# Patient Record
Sex: FEMALE | Race: WHITE | NOT HISPANIC OR LATINO | URBAN - METROPOLITAN AREA
[De-identification: names, ages, dates, MRNs, and addresses within clinical notes are randomized per-mention and may not be internally consistent; named-entity substitution may affect disease eponyms.]

---

## 2020-05-14 ENCOUNTER — TELEPHONE (OUTPATIENT)
Dept: FAMILY MEDICINE CLINIC | Facility: CLINIC | Age: 50
End: 2020-05-14

## 2020-05-14 DIAGNOSIS — R51.9 NONINTRACTABLE HEADACHE, UNSPECIFIED CHRONICITY PATTERN, UNSPECIFIED HEADACHE TYPE: Primary | ICD-10-CM

## 2020-05-14 RX ORDER — SUMATRIPTAN 100 MG/1
100 TABLET, FILM COATED ORAL ONCE AS NEEDED
Qty: 9 TABLET | Refills: 3 | Status: SHIPPED | OUTPATIENT
Start: 2020-05-14 | End: 2020-07-29

## 2020-07-29 DIAGNOSIS — R51.9 NONINTRACTABLE HEADACHE, UNSPECIFIED CHRONICITY PATTERN, UNSPECIFIED HEADACHE TYPE: ICD-10-CM

## 2020-07-29 RX ORDER — SUMATRIPTAN 100 MG/1
TABLET, FILM COATED ORAL
Qty: 9 TABLET | Refills: 2 | Status: SHIPPED | OUTPATIENT
Start: 2020-07-29 | End: 2020-08-20

## 2020-08-20 DIAGNOSIS — R51.9 NONINTRACTABLE HEADACHE, UNSPECIFIED CHRONICITY PATTERN, UNSPECIFIED HEADACHE TYPE: ICD-10-CM

## 2020-08-20 RX ORDER — SUMATRIPTAN 100 MG/1
TABLET, FILM COATED ORAL
Qty: 9 TABLET | Refills: 2 | Status: SHIPPED | OUTPATIENT
Start: 2020-08-20 | End: 2020-08-24

## 2020-08-24 RX ORDER — SUMATRIPTAN 100 MG/1
TABLET, FILM COATED ORAL
Qty: 9 TABLET | Refills: 2 | Status: SHIPPED | OUTPATIENT
Start: 2020-08-24 | End: 2021-01-19 | Stop reason: SDUPTHER

## 2020-08-24 NOTE — TELEPHONE ENCOUNTER
I tried prior Kamlesh Gan and it said that it is covered under her insurance it's just that the refill is too soon

## 2020-09-09 ENCOUNTER — TELEPHONE (OUTPATIENT)
Dept: FAMILY MEDICINE CLINIC | Facility: CLINIC | Age: 50
End: 2020-09-09

## 2020-09-09 NOTE — TELEPHONE ENCOUNTER
Patient continues with headache prefers to be seen by a neurologist is requesting a Dr-Dr referral once completed please call patient back, thanks   ND

## 2020-09-10 DIAGNOSIS — R51.9 HEADACHE DISORDER: Primary | ICD-10-CM

## 2021-01-08 ENCOUNTER — TELEPHONE (OUTPATIENT)
Dept: NEUROLOGY | Facility: CLINIC | Age: 51
End: 2021-01-08

## 2021-01-19 ENCOUNTER — CONSULT (OUTPATIENT)
Dept: NEUROLOGY | Facility: CLINIC | Age: 51
End: 2021-01-19
Payer: COMMERCIAL

## 2021-01-19 VITALS — DIASTOLIC BLOOD PRESSURE: 68 MMHG | SYSTOLIC BLOOD PRESSURE: 124 MMHG | HEART RATE: 82 BPM | WEIGHT: 154.6 LBS

## 2021-01-19 DIAGNOSIS — R51.9 NONINTRACTABLE HEADACHE, UNSPECIFIED CHRONICITY PATTERN, UNSPECIFIED HEADACHE TYPE: ICD-10-CM

## 2021-01-19 DIAGNOSIS — R51.9 HEADACHE DISORDER: ICD-10-CM

## 2021-01-19 PROCEDURE — 99244 OFF/OP CNSLTJ NEW/EST MOD 40: CPT | Performed by: PSYCHIATRY & NEUROLOGY

## 2021-01-19 RX ORDER — IBUPROFEN 200 MG
200 TABLET ORAL AS NEEDED
COMMUNITY
End: 2022-01-13 | Stop reason: ALTCHOICE

## 2021-01-19 RX ORDER — SUMATRIPTAN 100 MG/1
100 TABLET, FILM COATED ORAL ONCE AS NEEDED
Qty: 9 TABLET | Refills: 3 | Status: SHIPPED | OUTPATIENT
Start: 2021-01-19 | End: 2021-06-09

## 2021-01-19 NOTE — PATIENT INSTRUCTIONS
Additionally on headache diary - stress level, what you ate, weather    Try OTC medications: riboflavin, magnesium, CoQ10

## 2021-01-19 NOTE — ASSESSMENT & PLAN NOTE
Zev Perla is presenting to clinic 01/19/21 for management of headache  Her headaches have evolved from behind the eyes to the back of the neck over the past few years  No aura  Family history positive in mother, sister, and grandmother     Pt denies red flag symptoms such as sudden onset headache, focal exam findings, stiff neck, temperature, altered mental status    Medications:   Previous trials: magnesium/multiivitamin dulled headaches to a 2/10   Current medications (abortive and preventative): Imitrex, motrin    Workup:   No previous imaging - imaging deferred at this time    Plan:   Pt counseled to increase intake of fluids to  oz daily   Pt counseled on benefits of OTC magnesium and riboflavin, coq10   Medication plan: continue sumatriptan and motrin   Imaging recommendations: none at this time   Follow up in 4 months   Add weather, diet, stress to headache diary   Remove triggering foods from diet again   Increase exercise   Participate in stress reducing activities as discussed during visit   If no improvement in headache frequency at next visit will consider possible imaging or preventative medication at that time

## 2021-01-19 NOTE — PROGRESS NOTES
NEUROLOGY RESIDENCY OUTPATIENT CONSULT NOTE       Name:  Elvia Cabezas  MRN: [id number]  : 1970  Date: 21    ASSESSMENT & PLAN     Headache disorder  Elvia Cabezas is presenting to clinic 21 for management of headache  Her headaches have evolved from behind the eyes to the back of the neck over the past few years  No aura  Family history positive in mother, sister, and grandmother   Pt denies red flag symptoms such as sudden onset headache, focal exam findings, stiff neck, temperature, altered mental status    Medications:   Previous trials: magnesium/multiivitamin dulled headaches to a 2/10   Current medications (abortive and preventative): Imitrex, motrin    Workup:   No previous imaging - imaging deferred at this time    Plan:   Pt counseled to increase intake of fluids to  oz daily   Pt counseled on benefits of OTC magnesium and riboflavin, coq10   Medication plan: continue sumatriptan and motrin   Imaging recommendations: none at this time   Follow up in 4 months   Add weather, diet, stress to headache diary   Remove triggering foods from diet again   Increase exercise   Participate in stress reducing activities as discussed during visit   If no improvement in headache frequency at next visit will consider possible imaging or preventative medication at that time       Diagnoses and all orders for this visit:    Headache disorder  -     Ambulatory referral to Neurology    Nonintractable headache, unspecified chronicity pattern, unspecified headache type  -     SUMAtriptan (IMITREX) 100 mg tablet; Take 1 tablet (100 mg total) by mouth once as needed for migraine for up to 36 doses    Other orders  -     ibuprofen (MOTRIN) 200 mg tablet;  Take 200 mg by mouth as needed for headaches         SUBJECTIVE     HPI (taken on 21):    Elvia Cabezas is a 46 y o  female who presents to neuro clinic for assessment of chronic headahce occurring approx 4 days a month for 2-24 hours at a time  The patient states these headaches initially started as a teenager and symptoms appear to be aggravated or provoked by foods, stress, weather and are alleviated by motrin and sumatriptan  She describes the quality and character of the headaches as throbbing pain radiating from the neck up and over the head  On a scale of 1-10, she describes the average headache as a 5-8/10  Since the original onset of these headaches, she states they have stayed the same in character but have increased in frequency this year  She endorses associated symptoms of photophobia and nausea, and denies, numbness/tingling, visual disturbances, auras, or other symptoms  Previous workup for these headaches has included nothing  Previous medications tried for headaches include: motrin, sumatriptan, Excedrin migraine  she currently takes sumatriptan and motrin which improve the symptoms  The patient has a medical history pertinent for anxiety  Family history is significant for migraine in her mother and grandmother and migraine with aura in her sister  In terms of social history, she does not use tobacco, does not use etoh, and does not use illicit substances  She is a registered dietician  On presentation to the clinic today, the patient does not presently have a headache  Prior medications tried:  - Preventative: none   - Abortive: sumatriptan, Excedrin migraine, motrin, tylenol    Headaches:  Headaches started at what age - teenager  Accident or injury prior to onset of headaches - no  How often do the headaches occur - 1-2 times per month but come and go for several days   What time of the day do the headaches start - anytime     How long do the headaches last - all day  Are you ever headache free - yes  Where are they located - radiate from the neck over the head  What is the intensity of pain -5-8/10  Describe your usual headache - Throbbing, Pounding, Pressure  Associated symptoms: photophobia, nausea, problem with concentration, stiff or sore neck, prefer to be alone and in a dark room, unable to work  Headaches are worse if the patient: has stress or eats poorly  Headache trigger: Fatigue, Stress/Tension, Weather change, bright lights, sunlight, Certain Foods (MSG), Menstruation, Caffeine, Lack of sleep, Oversleeping,   Are you current pregnant or planning on getting pregnant? no  Have you had trigger point injection performed and how often -no  Have you had Botox injection performed and how often -no  Have you had epidural injections or transforaminal injections performed -no  What medications do you take or have you taken for your headaches? PREVENTIVE: none  ABORTIVE: sumatriptan, motrin, excedrin migraine, tylenol      Past Medical History:  History reviewed  No pertinent past medical history  Allergies:  No Known Allergies    Family history:  Family History   Problem Relation Age of Onset    Skin cancer Mother     Prostate cancer Father     Hypertension Father     Hyperlipidemia Father     Skin cancer Father     Stroke Maternal Grandmother     Diabetes Paternal Grandfather     Bipolar disorder Maternal Uncle     Colon cancer Paternal Aunt 72       Social History  Living situation:  At home with  and son  Tobacco:  No tobacco use   Alcohol:  No alcohol use  Drugs:  No illegal drug use  Work:  dietician  Driving:  yes    OBJECTIVE     Patient ID: Nereyda Pierre is a 46 y o  female    Blood pressure 124/68, pulse 82, weight 70 1 kg (154 lb 9 6 oz)  General exam   Appearance: normally developed, appears well  Cardiovascular: regular rate and rhythm  Pulmonary: clear to auscultation  Extremities: normal color, temperature, peripheral pulses intact  HEENT: anicteric     Neurologic Exam     Mental Status   Oriented to person, place, and time  Attention: normal  Concentration: normal    Speech: speech is normal   Level of consciousness: alert  Knowledge: good  Normal comprehension       Cranial Nerves     CN II   Visual fields full to confrontation  CN III, IV, VI   Pupils are equal, round, and reactive to light  Extraocular motions are normal    Nystagmus: none     CN V   Facial sensation intact  CN VII   Facial expression full, symmetric  CN VIII   CN VIII normal      CN IX, X   CN IX normal    CN X normal      CN XI   CN XI normal      CN XII   CN XII normal      Motor Exam   Muscle bulk: normal  Overall muscle tone: normal  Right arm pronator drift: absent  Left arm pronator drift: absent    Strength   Strength 5/5 throughout  Sensory Exam   Light touch normal      Gait, Coordination, and Reflexes     Gait  Gait: normal    Coordination   Romberg: negative  Finger to nose coordination: normal    Tremor   Resting tremor: absent  Intention tremor: absent  Action tremor: absent    Reflexes   Right brachioradialis: 2+  Left brachioradialis: 2+  Right biceps: 2+  Left biceps: 2+  Right triceps: 2+  Left triceps: 2+  Right patellar: 2+  Left patellar: 2+  Right achilles: 2+  Left achilles: 2+       IMAGING & DIAGNOSTIC TESTING     Radiology Results: I have personally reviewed pertinent reports  Other Diagnostic Testing: I have personally reviewed pertinent reports  ACTIVE MEDICATIONS     Prior to Admission medications    Medication Sig Start Date End Date Taking? Authorizing Provider   ibuprofen (MOTRIN) 200 mg tablet Take 200 mg by mouth as needed for headaches   Yes Historical Provider, MD   SUMAtriptan (IMITREX) 100 mg tablet TAKE 1 TABLET BY MOUTH AT ONSET OF MIGRAINE MAY REPEAT AGAIN IN 2 HRS 8/24/20  Yes MD AGUSTIN Sen     Review of systems as documented by the MA was reviewed in full by myself, Saundra Beck MD    ROS:    Review of Systems   Constitutional: Negative  Negative for appetite change and fever  HENT: Negative  Negative for hearing loss, tinnitus, trouble swallowing and voice change  Eyes: Negative  Negative for photophobia and pain  Respiratory: Negative  Negative for shortness of breath  Cardiovascular: Negative  Negative for palpitations  Gastrointestinal: Negative  Negative for nausea and vomiting  Endocrine: Negative  Negative for cold intolerance  Genitourinary: Negative  Negative for dysuria, frequency and urgency  Musculoskeletal: Negative  Negative for myalgias and neck pain  Skin: Negative  Negative for rash  Neurological: Positive for headaches  Negative for dizziness, tremors, seizures, syncope, facial asymmetry, speech difficulty, weakness, light-headedness and numbness  Hematological: Negative  Does not bruise/bleed easily  Psychiatric/Behavioral: Negative  Negative for confusion, hallucinations and sleep disturbance

## 2021-04-27 ENCOUNTER — OFFICE VISIT (OUTPATIENT)
Dept: NEUROLOGY | Facility: CLINIC | Age: 51
End: 2021-04-27
Payer: COMMERCIAL

## 2021-04-27 VITALS — HEART RATE: 76 BPM | SYSTOLIC BLOOD PRESSURE: 130 MMHG | DIASTOLIC BLOOD PRESSURE: 78 MMHG

## 2021-04-27 DIAGNOSIS — R51.9 HEADACHE DISORDER: Primary | ICD-10-CM

## 2021-04-27 PROCEDURE — 99213 OFFICE O/P EST LOW 20 MIN: CPT | Performed by: PSYCHIATRY & NEUROLOGY

## 2021-04-27 NOTE — PROGRESS NOTES
Patient ID: Chuckie Barrios is a 46 y o  female  Assessment/Plan:    No problem-specific Assessment & Plan notes found for this encounter  {Assess/PlanSmartLinks:40864}       Subjective:    HPI    {[facility] HPI texts:04776}    {Common ambulatory SmartLinks:08364}         Objective:    Blood pressure 130/78, pulse 76  Physical Exam    Neurological Exam      ROS:    Review of Systems   Constitutional: Negative  Negative for appetite change and fever  HENT: Negative  Negative for hearing loss, tinnitus, trouble swallowing and voice change  Eyes: Negative  Negative for photophobia and pain  Respiratory: Negative  Negative for shortness of breath  Cardiovascular: Negative  Negative for palpitations  Gastrointestinal: Negative  Negative for nausea and vomiting  Endocrine: Negative  Negative for cold intolerance  Genitourinary: Negative  Negative for dysuria, frequency and urgency  Musculoskeletal: Negative  Negative for myalgias and neck pain  Skin: Negative  Negative for rash  Neurological: Positive for headaches  Negative for dizziness, tremors, seizures, syncope, facial asymmetry, speech difficulty, weakness, light-headedness and numbness  Hematological: Negative  Does not bruise/bleed easily  Psychiatric/Behavioral: Negative  Negative for confusion, hallucinations and sleep disturbance

## 2021-04-27 NOTE — ASSESSMENT & PLAN NOTE
Percy Rangel is presenting to clinic 04/27/21 for management of headache  Her headaches have evolved from behind the eyes to the back of the neck over the past few years  No aura  Family history positive in mother, sister, and grandmother     Pt denies red flag symptoms such as sudden onset headache, focal exam findings, stiff neck, temperature, altered mental status    Medications:   Previous trials: magnesium/multiivitamin dulled headaches to a 2/10   Current medications (abortive and preventative): Imitrex, motrin    Workup:   No previous imaging - imaging deferred at this time    Plan:   Pt counseled to increase intake of fluids to  oz daily   Pt counseled on benefits of OTC magnesium and riboflavin, coq10   Medication plan: continue sumatriptan and motrin   Imaging recommendations: none at this time   Follow up in 4 months   Add weather, diet, stress to headache diary   Remove triggering foods from diet again   Increase exercise   Participate in stress reducing activities as discussed during visit

## 2021-04-27 NOTE — PATIENT INSTRUCTIONS
Take magnesium and B2 consistently  Keep stress down  Keep working on diet and your diet triggers  Goal is to get down to 1 dose of sumatriptan per month! Remember to monitor your posture and don't over do it! Keep positive!

## 2021-04-27 NOTE — PROGRESS NOTES
NEUROLOGY RESIDENCY OUTPATIENT FOLLOW UP NOTE       Name:  Abilio Galo  MRN: [id number]  : 1970  Date: 21    ASSESSMENT & PLAN     Headache disorder  Abilio Galo is presenting to clinic 21 for management of headache  Her headaches have evolved from behind the eyes to the back of the neck over the past few years  No aura  Family history positive in mother, sister, and grandmother   Pt denies red flag symptoms such as sudden onset headache, focal exam findings, stiff neck, temperature, altered mental status    Medications:   Previous trials: magnesium/multiivitamin dulled headaches to a 2/10   Current medications (abortive and preventative): Imitrex, motrin    Workup:   No previous imaging - imaging deferred at this time    Plan:   Pt counseled to increase intake of fluids to  oz daily   Pt counseled on benefits of OTC magnesium and riboflavin, coq10   Medication plan: continue sumatriptan and motrin   Imaging recommendations: none at this time   Follow up in 4 months   Add weather, diet, stress to headache diary   Remove triggering foods from diet again   Increase exercise   Participate in stress reducing activities as discussed during visit       Diagnoses and all orders for this visit:    Headache disorder         SUBJECTIVE     Abilio Galo is presenting for follow up on chronic migraine since she was a teenager  Relevant medical history includes: anxiety, FH significant for migraine in her mother, grandmother and migraine with aura in her sister  First evaluation was 21  LOV same as above    To review, Abilio Galo presented for evaluation of headache  She had migraines since she was a teenager and was previously on several abortive meds such as imitrex, and other OTC options  She reported a gradual increase in frequency over the last year and felt they were exacerbated by stress, certain foods, weather but denied any auras   At her last visit, she wanted to continue on imitrex and ibuprofen and deferred imaging unless there was no improvement  Previous medications:  - tylenol, ibuprofen    Current medications:  - imitrex 100mg  - motrin 200mg    Interval history as of 04/27/21, improvement since last visit  Not consistent with mag or B2 but would like to take more often and thinks it will help  Has only been having about 2 headaches requiring imitrex a month  Has been keeping journal and has been good at not skipping meals  Recognizes that she could eat better and does have less stress now  OBJECTIVE     Patient ID: Lauren Worthy is a 46 y o  female    Blood pressure 130/78, pulse 76  GENERAL EXAM:    CONSTITUTIONAL: Well developed, well nourished, well groomed  No dysmorphic features  Eyes:  PERRLA, EOM normal      Neck:  Normal ROM, neck supple  HEENT:  Normocephalic atraumatic  No meningismus  Oropharynx is clear and moist  No oral mucosal lesions  Chest:  Respirations regular and unlabored  Cardiovascular:  Distal extremities warm without palpable edema or tenderness, no observed significant swelling  Musculoskeletal:  Full range of motion  Skin:  warm and dry   Psychiatric:  Normal behavior and appropriate affect        Neurological Exam  Mental Status  Awake, alert and oriented to person, place and time  Recent and remote memory are intact  Speech is normal  Language is fluent with no aphasia  Attention and concentration are normal  Fund of knowledge is appropriate for level of education  Cranial Nerves  CN II: Visual acuity is normal  Visual fields full to confrontation  CN III, IV, VI: Extraocular movements intact bilaterally  Normal lids and orbits bilaterally  Pupils equal round and reactive to light bilaterally  CN V: Facial sensation is normal   CN VII: Full and symmetric facial movement  CN VIII: Hearing is normal   CN IX, X: Palate elevates symmetrically  Normal gag reflex    CN XI: Shoulder shrug strength is normal   CN XII: Tongue midline without atrophy or fasciculations  Motor  Normal muscle bulk throughout  No fasciculations present  Normal muscle tone  No abnormal involuntary movements  Strength is 5/5 throughout all four extremities  Sensory  Sensation is intact to light touch, pinprick, vibration and proprioception in all four extremities  Reflexes  Deep tendon reflexes are 2+ and symmetric in all four extremities with downgoing toes bilaterally  Coordination  Finger-to-nose, rapid alternating movements and heel-to-shin normal bilaterally without dysmetria  Gait  Normal casual, toe, heel and tandem gait  IMAGING & DIAGNOSTIC TESTING     Radiology Results: I have personally reviewed pertinent reports and I have personally reviewed pertinent films in PACS    Other Diagnostic Testing: I have personally reviewed pertinent reports  STUDIES REVIEWED:    Imaging:  No imaging at this time    EEGs/EMGs:  none    Labs:  None pertinent    ACTIVE MEDICATIONS     Prior to Admission medications    Medication Sig Start Date End Date Taking? Authorizing Provider   ibuprofen (MOTRIN) 200 mg tablet Take 200 mg by mouth as needed for headaches    Historical Provider, MD   SUMAtriptan (IMITREX) 100 mg tablet Take 1 tablet (100 mg total) by mouth once as needed for migraine for up to 36 doses 1/19/21   Zacarias Russell MD       ROS     Review of systems as documented by the MA was reviewed in full by myself, Zacarias Russell MD    Review of Systems   Constitutional: Negative  Negative for appetite change and fever  HENT: Negative  Negative for hearing loss, tinnitus, trouble swallowing and voice change  Eyes: Negative  Negative for photophobia and pain  Respiratory: Negative  Negative for shortness of breath  Cardiovascular: Negative  Negative for palpitations  Gastrointestinal: Negative  Negative for nausea and vomiting  Endocrine: Negative  Negative for cold intolerance     Genitourinary: Negative  Negative for dysuria, frequency and urgency  Musculoskeletal: Negative  Negative for myalgias and neck pain  Skin: Negative  Negative for rash  Neurological: Positive for headaches  Negative for dizziness, tremors, seizures, syncope, facial asymmetry, speech difficulty, weakness, light-headedness and numbness  Hematological: Negative  Does not bruise/bleed easily  Psychiatric/Behavioral: Negative  Negative for confusion, hallucinations and sleep disturbance

## 2021-06-09 DIAGNOSIS — R51.9 NONINTRACTABLE HEADACHE, UNSPECIFIED CHRONICITY PATTERN, UNSPECIFIED HEADACHE TYPE: ICD-10-CM

## 2021-06-09 RX ORDER — SUMATRIPTAN 100 MG/1
100 TABLET, FILM COATED ORAL ONCE AS NEEDED
Qty: 9 TABLET | Refills: 3 | Status: SHIPPED | OUTPATIENT
Start: 2021-06-09 | End: 2021-10-14

## 2021-09-27 ENCOUNTER — OFFICE VISIT (OUTPATIENT)
Dept: DERMATOLOGY | Facility: CLINIC | Age: 51
End: 2021-09-27
Payer: COMMERCIAL

## 2021-09-27 VITALS — BODY MASS INDEX: 25.15 KG/M2 | WEIGHT: 156.5 LBS | HEIGHT: 66 IN | TEMPERATURE: 98 F

## 2021-09-27 DIAGNOSIS — D22.5 MULTIPLE BENIGN MELANOCYTIC NEVI OF UPPER EXTREMITY, LOWER EXTREMITY, AND TRUNK: ICD-10-CM

## 2021-09-27 DIAGNOSIS — L81.4 LENTIGO: ICD-10-CM

## 2021-09-27 DIAGNOSIS — I83.93 VARICOSE VEINS OF BOTH LOWER EXTREMITIES, UNSPECIFIED WHETHER COMPLICATED: ICD-10-CM

## 2021-09-27 DIAGNOSIS — L57.0 ACTINIC KERATOSIS: ICD-10-CM

## 2021-09-27 DIAGNOSIS — D48.5 NEOPLASM OF UNCERTAIN BEHAVIOR OF SKIN: Primary | ICD-10-CM

## 2021-09-27 DIAGNOSIS — L81.2 EPHELIS: ICD-10-CM

## 2021-09-27 DIAGNOSIS — D22.70 MULTIPLE BENIGN MELANOCYTIC NEVI OF UPPER EXTREMITY, LOWER EXTREMITY, AND TRUNK: ICD-10-CM

## 2021-09-27 DIAGNOSIS — D22.60 MULTIPLE BENIGN MELANOCYTIC NEVI OF UPPER EXTREMITY, LOWER EXTREMITY, AND TRUNK: ICD-10-CM

## 2021-09-27 PROCEDURE — 11102 TANGNTL BX SKIN SINGLE LES: CPT | Performed by: DERMATOLOGY

## 2021-09-27 PROCEDURE — 88305 TISSUE EXAM BY PATHOLOGIST: CPT | Performed by: PATHOLOGY

## 2021-09-27 PROCEDURE — 99204 OFFICE O/P NEW MOD 45 MIN: CPT | Performed by: DERMATOLOGY

## 2021-09-27 PROCEDURE — 17000 DESTRUCT PREMALG LESION: CPT | Performed by: DERMATOLOGY

## 2021-09-27 NOTE — PROGRESS NOTES
[facility] Note     Patient Name: Reshma Wells  Encounter Date: 09/27/2021     Have you been cared for by a [facility] in the last 3 years and, if so, which one? No    · Have you traveled outside of the Health system in the past 3 months? No     May we call your Preferred Phone number to discuss your specific medical information? Yes     May we leave a detailed message that includes your specific medical information? Yes      Today's Chief Concerns:   Concern #1:  Full check    Concern #2:  Area of concern on right upper arm    Past Medical History:  Have you personally ever had or currently have any of the following? · Skin cancer (such as Melanoma, Basal Cell Carcinoma, Squamous Cell Carcinoma? (If Yes, please provide more detail)- No  · Eczema: No  · Psoriasis: No  · HIV/AIDS: No  · Hepatitis B or C: No  · Tuberculosis: No  · Systemic Immunosuppression such as Diabetes, Biologic or Immunotherapy, Chemotherapy, Organ Transplantation, Bone Marrow Transplantation (If YES, please provide more detail): No  · Radiation Treatment (If YES, please provide more detail): No  · Any other major medical conditions/concerns? 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 Gardening and reading     Family history:    Have any of your "first degree relatives" (parent, brother, sister, or child) had any of the following       · Skin cancer such as Melanoma or Merkel Cell Carcinoma or Pancreatic Cancer? YES, father - basal cell and squamous cell  Mother- basal cell   · Eczema, Asthma, Hay Fever or Seasonal Allergies: No  · Psoriasis or Psoriatic Arthritis: No  · Do any other medical conditions seem to run in your family? If Yes, what condition and which relatives?   No    Current Medications:    Current Outpatient Medications:     ibuprofen (MOTRIN) 200 mg tablet, Take 200 mg by mouth as needed for headaches, Disp: , Rfl:     SUMAtriptan (IMITREX) 100 mg tablet, TAKE 1 TABLET (100 MG TOTAL) BY MOUTH ONCE AS NEEDED FOR MIGRAINE FOR UP TO 36 DOSES, Disp: 9 tablet, Rfl: 3      Review of Systems/System Alerts:  Have you recently had or currently have any of the following? If YES, what are you doing for the problem? · Fever, chills or unintended weight loss: No  · Sudden loss or change in your vision: No  · Nausea, vomiting or blood in your stool: No  · Painful or swollen joints: No  · Wheezing or cough: No  · Changing mole or non-healing wound: No  · Nosebleeds: No  · Excessive sweating: No  · Easy or prolonged bleeding? No  · Over the last 2 weeks, how often have you been bothered by the following problems? · Taking little interest or pleasure in doing things: 1 - Not at All  · Feeling down, depressed, or hopeless: 1 - Not at All  · Rapid heartbeat with epinephrine:  No  · FEMALES ONLY:    · Are you pregnant or planning to become pregnant? No  · Are you currently or planning to be nursing or breast feeding? No  · Any known allergies? No  No Known Allergies      PHYSICAL EXAM:       Was a chaperone (Derm Clinical Assistant) present throughout the entire Physical Exam? Yes     Did the Dermatology Team specifically  the patient on the importance of a Full Skin Exam to be sure that nothing is missed clinically?  Yes}  o Did the patient request or accept a Full Skin Exam?  Yes  o Did the patient specifically refuse to have the areas "under-the-bra" examined by the Dermatologist? No  o Did the patient specifically refuse to have the areas "under-the-underwear" examined by the Dermatologist? No      CONSTITUTIONAL:   Vitals:    09/27/21 0949   Temp: 98 °F (36 7 °C)   TempSrc: Temporal   Weight: 71 kg (156 lb 8 oz)   Height: 5' 6 34" (1 685 m)         PSYCH: Normal mood and affect  EYES: Normal conjunctiva  ENT: Normal lips and oral mucosa  CARDIOVASCULAR: No edema  RESPIRATORY: Normal respirations  HEME/LYMPH/IMMUNO:  No regional lymphadenopathy except as noted below in ASSESSMENT AND PLAN BY DIAGNOSIS    SKIN:  FULL ORGAN SYSTEM EXAM  Hair, Scalp, Ears, Face Normal except as noted below in Assessment   Neck, Cervical Chain Nodes Normal except as noted below in Assessment   Right Arm/Hand/Fingers Normal except as noted below in Assessment   Left Arm/Hand/Fingers Normal except as noted below in Assessment   Chest/Breasts/Axillae Viewed areas Normal except as noted below in Assessment   Abdomen, Umbilicus Normal except as noted below in Assessment   Back/Spine Normal except as noted below in Assessment   Groin/Genitalia/Buttocks NOT EXAMINED   Right Leg, Foot, Toes Normal except as noted below in Assessment   Left Leg, Foot, Toes Normal except as noted below in Assessment        ASSESSMENT AND PLAN BY DIAGNOSIS:    History of Present Condition:     Duration:  How long has this been an issue for you?    o  last year    Location Affected:  Where on the body is this affecting you?    o  right upper arm    Quality:  Is there any bleeding, pain, itch, burning/irritation, or redness associated with the skin lesion? o  slight bleeding    Severity:  Describe any bleeding, pain, itch, burning/irritation, or redness on a scale of 1 to 10 (with 10 being the worst)  o  n/a   Timing:  Does this condition seem to be there pretty constantly or do you notice it more at specific times throughout the day?     o  constant    Context:  Have you ever noticed that this condition seems to be associated with specific activities you do?    o  denies    Modifying Factors:    o Anything that seems to make the condition worse?    -  rubbing   o What have you tried to do to make the condition better?    -  lotion    Associated Signs and Symptoms:  Does this skin lesion seem to be associated with any of the following:  o  SL AMB DERM SIGNS AND SYMPTOMS: Redness       LENTIGO    Physical Exam:   Anatomic Location Affected:  Upper extremities    Morphological Description:  light brown macules    Pertinent Positives:   Pertinent Negatives: Additional History of Present Condition:  Present on exam     Assessment and Plan:  Based on a thorough discussion of this condition and the management approach to it (including a comprehensive discussion of the known risks, side effects and potential benefits of treatment), the patient (family) agrees to implement the following specific plan:   The nature of sun-induced photo-aging and skin cancers is discussed  Sun avoidance, protective clothing, and the use of 30-SPF sunscreens is advised  Observe closely for skin damage/changes, and call if such occurs   Reassured benign     What is a lentigo? A lentigo is a pigmented flat or slightly raised lesion with a clearly defined edge  Unlike an ephelis (freckle), it does not fade in the winter months  There are several kinds of lentigo  The name lentigo originally referred to its appearance resembling a small lentil  The plural of lentigo is lentigines, although lentigos is also in common use  Who gets lentigines? Lentigines can affect males and females of all ages and races  Solar lentigines are especially prevalent in fair skinned adults  Lentigines associated with syndromes are present at birth or arise during childhood  What causes lentigines? Common forms of lentigo are due to exposure to ultraviolet radiation:   Sun damage including sunburn    Indoor tanning    Phototherapy, especially photochemotherapy (PUVA)    Ionizing radiation, eg radiation therapy, can also cause lentigines  Several familial syndromes associated with widespread lentigines originate from mutations in [place]-MAP kinase, mTOR signaling and PTEN pathways  What are the clinical features of lentigines?   Lentigines have been classified into several different types depending on what they look like, where they appear on the body, causative factors, and whether they are associated to other diseases or conditions  Lentigines may be solitary or more often, multiple  Most lentigines are smaller than 5 mm in diameter      Lentigo simplex   A precursor to junctional naevus    Arises during childhood and early adult life    Found on trunk and limbs    Small brown round or oval macule or thin plaque    Jagged or smooth edge    May have a dry surface    May disappear in time  Solar lentigo   A precursor to seborrhoeic keratosis    Found on chronically sun exposed sites such as hands, face, lower legs    May also follow sunburn to shoulders    Yellow, light or dark brown regular or irregular macule or thin plaque    May have a dry surface    Often has moth-eaten outline    Can slowly enlarge to several centimeters in diameter    May disappear, often through the process known as lichenoid keratosis    When atypical in appearance, may be difficult to distinguish from melanoma in situ  Ink spot lentigo   Also known as reticulated lentigo    Few in number compared to solar lentigines    Follows sunburn in very fair skinned individuals    Dark brown to black irregular ink spot-like macule  PUVA lentigo   Similar to ink spot lentigo but follows photochemotherapy (PUVA)    Location anywhere exposed to PUVA  Tanning bed lentigo   Similar to ink spot lentigo but follows indoor tanning    Location anywhere exposed to tanning bed  Radiation lentigo   Occurs in site of irradiation (accidental or therapeutic)    Associated with late-stage radiation dermatitis: epidermal atrophy, subcutaneous fibrosis, keratosis, telangiectasias  Melanotic macule   Mucosal surfaces or adjacent glabrous skin eg lip, vulva, penis, anus    Light to dark brown    Also called mucosal melanosis  Generalised lentigines   Found on any exposed or covered site from early childhood    Small macules may merge to form larger patches    Not associated with a syndrome    Also called lentigines profusa, multiple lentigines  Agminated lentigines   Naevoid eruption of lentigos confined to a single segmental area    Sharp demarcation in midline    May have associated neurological and developmental abnormalities  Patterned lentigines   Inherited tendency to lentigines on face, lips, buttocks, palms, soles    Recognised mainly in people of  ethnicity  Centrofacial neurodysraphic lentiginosis  Associated with mental retardation  Lentiginosis syndromes   Syndromes include LEOPARD/[place], Peutz-Jeghers, Laugier-Hunziker, Moynahan, Xeroderma pigmentosum, myxoma syndromes (NAVARRO, NAME, Fernandez), Ruvalcaba-Myhre-Jackson, Bannayan-Zonnana syndrome, Cowden disease (multiple hamartoma syndrome )    Inheritance is autosomal dominant; sporadic cases common    Widespread lentigines present at birth or arise in early childhood    Associated with neural, endocrine, and mesenchymal tumors    How is the diagnosis made? Lentigines are usually diagnosed clinically by their typical appearance  Concern regarding possibility of melanoma may lead to:   Dermatoscopy    Diagnostic excision biopsy    Histopathology of a lentigo shows:   Thickened epidermis    An increased number of melanocytes along the basal layer of epidermis    Unlike junctional melanocytic naevus, there are no nests of melanocytes    Increased melanin pigment within the keratinocytes    Additional features depending on type of lentigo    In contrast, an ephelis (freckle) shows sun-induced increased melanin within the keratinocytes, without an increase in number of cells  What is the treatment for lentigines? Most lentigines are left alone  Attempts to lighten them may not be successful   The following approaches are used:   SPF 50+ broad-spectrum sunscreen    Hydroquinone bleaching cream    Alpha hydroxy acids    Vitamin C    Retinoids    Azelaic acid    Chemical peels  Individual lesions can be permanently removed using:   Cryotherapy    Intense pulsed light    Pigment lasers    How can lentigines be prevented? Lentigines associated with exposure ultraviolet radiation can be prevented by very careful sun protection  Clothing is more successful at preventing new lentigines than are sunscreens  What is the outlook for lentigines? Lentigines usually persist  They may increase in number with age and sun exposure  Some in sun-protected sites may fade and disappear  ACTINIC KERATOSIS    Physical Exam:   Anatomic Location Affected:  Right temple    Morphological Description:  Scaly pink papules     Additional History of Present Condition:  Present on exam     Assessment and Plan:  Based on a thorough discussion of this condition and the management approach to it (including a comprehensive discussion of the known risks, side effects and potential benefits of treatment), the patient (family) agrees to implement the following specific plan:     Completed x 3 cycles of cryotherapy in office with signed consent     Actinic keratoses are very common on sites repeatedly exposed to the sun, especially the backs of the hands and the face, most often affecting the ears, nose, cheeks, upper lip, vermilion of the lower lip, temples, forehead and balding scalp  In severely chronically sun-damaged individuals, they may also be found on the upper trunk, upper and lower limbs, and dorsum of feet  We discussed the theoretical premalignant (pre-cancerous) nature and etiology of these growths  We discussed the prevailing notion that actinic keratoses are a reflection of abnormal skin cell development due to DNA damage by short wavelength UVB  They are more likely to appear if the immune function is poor, due to aging, recent sun exposure, predisposing disease or certain drugs  We discussed that the main concern is that actinic keratoses may predispose to squamous cell carcinoma   It is rare for a solitary actinic keratosis to evolve to squamous cell carcinoma (SCC), but the risk of SCC occurring at some stage in a patient with more than 10 actinic keratoses is thought to be about 10 to 15%  A tender, thickened, ulcerated or enlarging actinic keratosis is suspicious of SCC  Actinic keratoses may be prevented by strict sun protection  If already present, keratoses may improve with a very high sun protection factor (50+) broad-spectrum sunscreen applied at least daily to affected areas, year-round  We recommend that UPF-rated clothing and hats and sunglasses be worn whenever possible and that a sunscreen-moisturizer combination product such as Neutrogena Daily Defense be applied at least three times a day  We performed a thorough discussion of treatment options and specific risk/benefits/alternatives including but not limited to medical field treatment with medications such as the following:     Topical field area medications such as 5-fluorouracil or Aldara (specifically, the trouble with long-term compliance, blistering and local skin reaction versus the convenience of at-home therapy and that field therapy gets what is not yet seen)   Cryotherapy (specifically, local pain, scarring, dyspigmentation, blistering, possible superinfection, and treats only what we see versus directed treatment today)   Photodynamic therapy (specifically, local pain, scarring, dyspigmentation, blistering, possible superinfection, need to schedule for a later date, and time spent in the office versus field therapy that gets what is not yet seen)      PROCEDURE:  DESTRUCTION OF PRE-MALIGNANT LESIONS  After a thorough discussion of treatment options and risk/benefits/alternatives (including but not limited to local pain, scarring, dyspigmentation, blistering, and possible superinfection), verbal and written consent were obtained and the aforementioned lesions were treated on with cryotherapy using liquid nitrogen x 3 cycle for 5-10 seconds   TOTAL NUMBER of 1 pre-malignant lesions were treated today on the ANATOMIC LOCATION: right temple  The patient tolerated the procedure well, and after-care instructions were provided  MELANOCYTIC NEVI ("Moles")    Physical Exam:   Anatomic Location Affected:  Mostly on sun-exposed areas of the trunk and extremities    Morphological Description:  Scattered, 1-4mm round to ovoid, symmetrical-appearing, even bordered, skin colored to dark brown macules/papules, mostly in sun-exposed areas   Pertinent Positives:   Pertinent Negatives: Additional History of Present Condition:  Present on exam     Assessment and Plan:  Based on a thorough discussion of this condition and the management approach to it (including a comprehensive discussion of the known risks, side effects and potential benefits of treatment), the patient (family) agrees to implement the following specific plan:     Will continue to monitor      Melanocytic Nevi  Melanocytic nevi ("moles") are caused by collections of the color producing skin cells, or melanocytes, in 1 area in the skin  They can range in color from pink to dark brown and be either raised or flat  Some moles are present at birth (I e , "congenital nevi"), while others come up later in life (i e , "acquired nevi")  [place] Lei exposure also stimulates the body to make more moles, ie the more sun you get the more moles you'll grow  Clinically distinguishing a healthy mole from melanoma may be difficult  The "ABCDE's" of moles have been suggested as a means of helping to alert a person to a suspicious mole and the possible increased risk of melanoma  Asymmetry: Healthy moles tend to be symmetric, while melanomas are often asymmetric    Asymmetry means if you draw a line through the mole, the two halves do not match in color, size, shape, or surface texture Any mole that starts to demonstrate "asymmetry" should be examined promptly by a board certified dermatologist      Aimee Lane: Healthy moles tend to have discrete, even borders  The border of a melanoma often blends into the normal skin and does not sharply delineate the mole from normal skin  Any mole that starts to demonstrate "uneven borders" should be examined promptly     Color: Healthy moles tend to be one color throughout  Melanomas tend to be made up of different colors ranging from dark black, blue, white, or red  Any mole that demonstrates a color change should be examined promptly    Diameter: Healthy moles tend to be smaller than 0 6 cm in size; an exception are "congenital nevi" that can be larger  Melanomas tend to grow and can often be greater than 0 6 cm (1/4 of an inch, or the size of a pencil eraser)  This is only a guideline, and many normal moles may be larger than 0 6 cm without being unhealthy  Any mole that starts to change in size (small to bigger or bigger to smaller) should be examined promptly    Evolving: Healthy moles tend to "stay the same "  Melanomas may often show signs of change or evolution such as a change in size, shape, color, or elevation  Any mole that starts to itch, bleed, crust, burn, hurt, or ulcerate or demonstrate a change or evolution should be examined promptly by a board certified dermatologist       What are atypical moles or dysplastic nevi? Dysplastic moles are moles that have some of the ABCDE  changes listed above but  are not cancerous  Sometimes a biopsy and microscopic examination are needed to determine the difference  They may indicate an increased risk of melanoma in that person, especially if there is a family history of melanoma  What is a Melanoma? The main concern when looking at a new or changing mole it to evaluate whether it may be a melanoma  The appearance of a "new mole" remains one of the most reliable methods for identifying a malignant melanoma     A melanoma is a type of skin cancer that can be deadly if it spreads throughout the body  The prognosis of a Melanoma depends on how deep it has penetrated in the skin  If caught early, they generally will not have had time to grow into the deeper layers of the skin and they cure rate is then very high  Once the melanoma grows deeper into the skin, the cure rate drops dramatically  Therefore, early detection and removal of a malignant melanoma results in a much better chance of complete cure  VARICOSE VEINS    Physical Exam:   Anatomic Location Affected:  Bilateral legs    Morphological Description:  Dilated superficial veins   Pertinent Positives:   Pertinent Negatives: Additional History of Present Condition:  Present on exam     Assessment and Plan:  Based on a thorough discussion of this condition and the management approach to it (including a comprehensive discussion of the known risks, side effects and potential benefits of treatment), the patient (family) agrees to implement the following specific plan:   Discuss sclerotherapy for treatment     What are varicose veins? Varicose veins are engorged, tortuous, green, blue, or purple veins that are often found on the lower legs and feet  Varicose veins are also called varices or varicosities  Who gets varicose veins? Approximately one-third of men and women aged 18-64 years have varicose veins [1]  They are more common in women and those with a family history of venous disease  What causes varicose veins? In normal leg veins, one-way valves direct the flow of venous blood from superficial venules to larger superficial veins, then to the deep veins, and eventually to the heart  Muscle contractions create a pumping action to help the flow of blood to the heart (the venous return)  Risk factors for varicose veins   Obesity -- obesity increases venous reflux and venous pressure because of raised intra-abdominal pressure   Age -- varicose veins are increasingly common with age      Pregnancy -- the enlarged uterus causes increased intra-abdominal pressure and direct pressure on the iliac veins  Hormonal changes cause the valves and vessels to become more malleable   Prolonged standing -- increased hydrostatic pressures over time may cause venous distension and valve failure   Family history -- primary valvular failure is hereditary, and there is concordance in monozygotic twins in 75% of cases  What are the clinical features of varicose veins? Patients with varicose veins present because they are unsightly and because of a feeling of discomfort, heaviness, itching, or a dull ache  Patients can also present with complications such as bleeding, ulceration, and thrombophlebitis  What are the complications of varicose veins?  Bleeding: superficial veins are prone to trauma and can bleed, which can be potentially life-threatening   Ulceration: the increased pressure in varicose veins allows growth factors and circulating pro-inflammatory molecules to leak into the extravascular space, which leads to localized inflammation and the formation of a chronic venous leg ulcer   Thrombophlebitis: phlebitis is inflammation of the veins with erythema and painful induration of the affected veins  It is often associated with thrombosis (thrombophlebitis)  Thrombosis arises because of sluggish circulation of blood in the vein combined with a hypercoagulable state and may be superficial (superficial thrombophlebitis) or deep (deep vein thrombosis)   Venous stasis dermatitis: associated with increased venous pressure and pooling of inflammatory molecules  Patients present with brown discoloration, pruritus, and discoid or circumferential, acute or chronic eczema on the distal lower limbs   Lipodermatosclerosis: inflammatory and indurated form of panniculitis due to the presence of pro-inflammatory molecules  How are varicose veins diagnosed? Varicose veins are diagnosed clinically   A physical examination should include the entire venous system and is usually conducted with the patient lying down and standing up  The [facility] uses the Clinical Etiological Anatomical Pathophysiological (CEAP) classification of varicose veins:  CEAP classification   C0 -- No visible or palpable signs of venous disease    C1 -- Telangiectasias or reticular veins    C2 -- Varicose veins; diameter > 3 mm    C3 -- Oedema    C4 -- Changes in skin and subcutaneous tissue: pigmentation, eczema, lipodermatosclerosis, or atrophie heath    C5 -- Healed venous ulcer    C6 -- Active venous ulcer    Duplex Doppler ultrasound assessment should be performed to determine the extent of disease and the level of truncal reflux [8] and to plan treatment options  What is the differential diagnosis for varicose veins? Varicose veins are larger than telangiectasia (small red 'thread' veins, < 1 mm in diameter) and venulectasia (blue reticular vessels, 1-3 mm in diameter)  These are not detected on duplex ultrasound  What is the treatment for varicose veins? Weight loss (if overweight) and moderate physical activity should be encouraged in patients with varicose veins to reduce the risk of complications  Compression hosiery should be used to relieve discomfort and swelling, and especially when travelling  Treatment options for varicose veins are available from a vascular service   Endovenous thermal ablation: using a laser or radiofrequency device causes an irreversible thermal injury in the vein wall, which leads to scarring and absorption of the tissue over several months  The success rate for both different methods of ablation is 95%   Injection sclerotherapy: involves the injection of a sclerosant under ultrasound guidance to cause inflammation of the vessels of the wall and eventual collapse of the varicose network   Smaller surface veins can be injected by microsclerotherapy, involving smaller gauge needles with smaller amounts of sclerosant   Endovenous adhesive ablation: 'vein glue' technique involves the injection of medical grade adhesive cyanoacrylate glue through a catheter  Long term safety and efficacy data are lacking   Endovenous mechanicochemical ablation (ClariVein): a rotating occlusion catheter that mechanically agitates the vein lining while also spraying a liquid sclerosant  It induces more endothelial damage but lacks long term data   Surgery: high ligation, vein stripping and avulsion are less often used to treat varicose veins than in the past due to postoperative morbidity, recurrence rates, and the risks associated with anaesthesia and hospitalization   Lasers: telangiectasia and venulectasia can be treated with long wavelength vascular lasers, but these are unsuitable for larger varicose veins  What is the outcome for varicose veins? Whichever treatment option is used, varicose veins may recur and can be treated again  NEOPLASM OF UNCERTAIN BEHAVIOR OF SKIN    Physical Exam:   (Anatomic Location); (Size and Morphological Description); (Differential Diagnosis):  o Specimen A: right upper arm; skin ; shave biopsy; 46year old female with 1 x  0 5 cm hyperkeratotic papule actinic keratosis versus squamous cell carcinoma in situ     Pertinent Positives:   Pertinent Negatives: Additional History of Present Condition:  First noted the lesion about 1 year ago  Bleeding spontaneously on its own  Assessment and Plan:   I have discussed with the patient that a sample of skin via a "skin biopsy would be potentially helpful to further make a specific diagnosis under the microscope     Based on a thorough discussion of this condition and the management approach to it (including a comprehensive discussion of the known risks, side effects and potential benefits of treatment), the patient (family) agrees to implement the following specific plan:    o Procedure:  Skin Biopsy  After a thorough discussion of treatment options and risk/benefits/alternatives (including but not limited to local pain, scarring, dyspigmentation, blistering, possible superinfection, and inability to confirm a diagnosis via histopathology), verbal and written consent were obtained and portion of the rash was biopsied for tissue sample  See below for consent that was obtained from patient and subsequent Procedure Note  PROCEDURE SHAVE BIOPSY NOTE:     Performing Physician: Noble Baer Anatomic Location; Clinical Description with size (cm); Pre-Op Diagnosis:   Specimen A: right upper arm; skin ; shave biopsy; 46year old female with 1 x  0 5 cm hyperkeratotic papule actinic keratosis versus squamous cell carcinoma in situ     Post-op diagnosis: Same      Local anesthesia: 1% xylocaine with epi       Topical anesthesia: None     Hemostasis: Aluminum chloride       After obtaining informed consent  at which time there was a discussion about the purpose of biopsy  and low risks of infection and bleeding  The area was prepped and draped in the usual fashion  Anesthesia was obtained with 1% lidocaine with epinephrine  A shave biopsy to an appropriate sampling depth was obtained with a sterile blade (such as a 15-blade or DermaBlade)  The resulting wound was covered with surgical ointment and bandaged appropriately  The patient tolerated the procedure well without complications and was without signs of functional compromise  Specimen has been sent for review by Dermatopathology  Standard post-procedure care has been explained and has been included in written form within the patient's copy of Informed Consent  INFORMED CONSENT DISCUSSION AND POST-OPERATIVE INSTRUCTIONS FOR PATIENT    I   RATIONALE FOR PROCEDURE  I understand that a skin biopsy allows the Dermatologist to test a lesion or rash under the microscope to obtain a diagnosis    It usually involves numbing the area with numbing medication and removing a small piece of skin; sometimes the area will be closed with sutures  In this specific procedure, sutures are not usually needed  If any sutures are placed, then they are usually need to be removed in 2 weeks or less  I understand that my Dermatologist recommends that a skin "shave" biopsy be performed today  A local anesthetic, similar to the kind that a dentist uses when filling a cavity, will be injected with a very small needle into the skin area to be sampled  The injected skin and tissue underneath "will go to sleep and become numb so no pain should be felt afterwards  An instrument shaped like a tiny "razor blade" (shave biopsy instrument) will be used to cut a small piece of tissue and skin from the area so that a sample of tissue can be taken and examined more closely under the microscope  A slight amount of bleeding will occur, but it will be stopped with direct pressure and a pressure bandage and any other appropriate methods  I understands that a scar will form where the wound was created  Surgical ointment will be applied to help protect the wound  Sutures are not usually needed  II   RISKS AND POTENTIAL COMPLICATIONS   I understand the risks and potential complications of a skin biopsy include but are not limited to the following:   Bleeding   Infection   Pain   Scar/keloid   Skin discoloration   Incomplete Removal   Recurrence   Nerve Damage/Numbness/Loss of Function   Allergic Reaction to Anesthesia   Biopsies are diagnostic procedures and based on findings additional treatment or evaluation may be required   Loss or destruction of specimen resulting in no additional findings    My Dermatologist has explained to me the nature of the condition, the nature of the procedure, and the benefits to be reasonably expected compared with alternative approaches    My Dermatologist has discussed the likelihood of major risks or complications of this procedure including the specific risks listed above, such as bleeding, infection, and scarring/keloid  I understand that a scar is expected after this procedure  I understand that my physician cannot predict if the scar will form a "keloid," which extends beyond the borders of the wound that is created  A keloid is a thick, painful, and bumpy scar  A keloid can be difficult to treat, as it does not always respond well to therapy, which includes injecting cortisone directly into the keloid every few weeks  While this usually reduces the pain and size of the scar, it does not eliminate it  I understand that photographs may be taken before and after the procedure  These will be maintained as part of the medical providers confidential records and may not be made available to me  I further authorize the medical provider to use the photographs for teaching purposes or to illustrate scientific papers, books, or lectures if in his/her judgment, medical research, education, or science may benefit from its use  I have had an opportunity to fully inquire about the risks and benefits of this procedure and its alternatives  I have been given ample time and opportunity to ask questions and to seek a second opinion if I wished to do so  I acknowledge that there have specifically been no guarantees as to the cosmetic results from the procedure  I am aware that with any procedure there is always the possibility of an unexpected complication  III  POST-PROCEDURAL CARE (WHAT YOU WILL NEED TO DO "AFTER THE BIOPSY" TO OPTIMIZE HEALING)     Keep the area clean and dry  Try NOT to remove the bandage or get it wet for the first 24 hours   Gently clean the area and apply surgical ointment (such as Vaseline petrolatum ointment, which is available "over the counter" and not a prescription) to the biopsy site for up to 2 weeks straight  This acts to protect the wound from the outside world   Sutures are not usually placed in this procedure  If any sutures were placed, return for suture removal as instructed (generally 1 week for the face, 2 weeks for the body)   Take Acetaminophen (Tylenol) for discomfort, if no contraindications  Ibuprofen or aspirin could make bleeding worse   Call our office immediately for signs of infection: fever, chills, increased redness, warmth, tenderness, discomfort/pain, or pus or foul smell coming from the wound  WHAT TO DO IF THERE IS ANY BLEEDING? If a small amount of bleeding is noticed, place a clean cloth over the area and apply firm pressure for ten minutes  Check the wound after 10 minutes of direct pressure  If bleeding persists, try one more time for an additional 10 minutes of direct pressure on the area  If the bleeding becomes heavier or does not stop after the second attempt, or if you have any other questions about this procedure, then please call your [facility]'s Dermatologist by calling [phone] (SKIN)  I hereby acknowledge that I have reviewed and verified the site with my Dermatologist and have requested and authorized my Dermatologist to proceed with the procedure              Scribe Attestation    I,:  Malina Rodríguez am acting as a scribe while in the presence of the attending physician :       I,:  Alejandra Hall MD personally performed the services described in this documentation    as scribed in my presence :         Magalis Fisher,

## 2021-09-27 NOTE — PATIENT INSTRUCTIONS
LENTIGO    Assessment and Plan:  Based on a thorough discussion of this condition and the management approach to it (including a comprehensive discussion of the known risks, side effects and potential benefits of treatment), the patient (family) agrees to implement the following specific plan:   The nature of sun-induced photo-aging and skin cancers is discussed  Sun avoidance, protective clothing, and the use of 30-SPF sunscreens is advised  Observe closely for skin damage/changes, and call if such occurs   Reassured benign     What is a lentigo? A lentigo is a pigmented flat or slightly raised lesion with a clearly defined edge  Unlike an ephelis (freckle), it does not fade in the winter months  There are several kinds of lentigo  The name lentigo originally referred to its appearance resembling a small lentil  The plural of lentigo is lentigines, although lentigos is also in common use  Who gets lentigines? Lentigines can affect males and females of all ages and races  Solar lentigines are especially prevalent in fair skinned adults  Lentigines associated with syndromes are present at birth or arise during childhood  What causes lentigines? Common forms of lentigo are due to exposure to ultraviolet radiation:   Sun damage including sunburn    Indoor tanning    Phototherapy, especially photochemotherapy (PUVA)    Ionizing radiation, eg radiation therapy, can also cause lentigines  Several familial syndromes associated with widespread lentigines originate from mutations in [place]-MAP kinase, mTOR signaling and PTEN pathways  What are the clinical features of lentigines? Lentigines have been classified into several different types depending on what they look like, where they appear on the body, causative factors, and whether they are associated to other diseases or conditions  Lentigines may be solitary or more often, multiple  Most lentigines are smaller than 5 mm in diameter      Lentigo simplex   A precursor to junctional naevus    Arises during childhood and early adult life    Found on trunk and limbs    Small brown round or oval macule or thin plaque    Jagged or smooth edge    May have a dry surface    May disappear in time  Solar lentigo   A precursor to seborrhoeic keratosis    Found on chronically sun exposed sites such as hands, face, lower legs    May also follow sunburn to shoulders    Yellow, light or dark brown regular or irregular macule or thin plaque    May have a dry surface    Often has moth-eaten outline    Can slowly enlarge to several centimeters in diameter    May disappear, often through the process known as lichenoid keratosis    When atypical in appearance, may be difficult to distinguish from melanoma in situ  Ink spot lentigo   Also known as reticulated lentigo    Few in number compared to solar lentigines    Follows sunburn in very fair skinned individuals    Dark brown to black irregular ink spot-like macule  PUVA lentigo   Similar to ink spot lentigo but follows photochemotherapy (PUVA)    Location anywhere exposed to PUVA  Tanning bed lentigo   Similar to ink spot lentigo but follows indoor tanning    Location anywhere exposed to tanning bed  Radiation lentigo   Occurs in site of irradiation (accidental or therapeutic)    Associated with late-stage radiation dermatitis: epidermal atrophy, subcutaneous fibrosis, keratosis, telangiectasias  Melanotic macule   Mucosal surfaces or adjacent glabrous skin eg lip, vulva, penis, anus    Light to dark brown    Also called mucosal melanosis  Generalised lentigines   Found on any exposed or covered site from early childhood    Small macules may merge to form larger patches    Not associated with a syndrome    Also called lentigines profusa, multiple lentigines  Agminated lentigines   Naevoid eruption of lentigos confined to a single segmental area    Sharp demarcation in midline    May have associated neurological and developmental abnormalities  Patterned lentigines   Inherited tendency to lentigines on face, lips, buttocks, palms, soles    Recognised mainly in people of  ethnicity  Centrofacial neurodysraphic lentiginosis  Associated with mental retardation  Lentiginosis syndromes   Syndromes include LEOPARD/[place], Peutz-Jeghers, Laugier-Hunziker, Moynahan, Xeroderma pigmentosum, myxoma syndromes (NAVARRO, NAME, Fernandez), Ruvalcaba-Myhre-Jackson, Bannayan-Zonnana syndrome, Cowden disease (multiple hamartoma syndrome )    Inheritance is autosomal dominant; sporadic cases common    Widespread lentigines present at birth or arise in early childhood    Associated with neural, endocrine, and mesenchymal tumors    How is the diagnosis made? Lentigines are usually diagnosed clinically by their typical appearance  Concern regarding possibility of melanoma may lead to:   Dermatoscopy    Diagnostic excision biopsy    Histopathology of a lentigo shows:   Thickened epidermis    An increased number of melanocytes along the basal layer of epidermis    Unlike junctional melanocytic naevus, there are no nests of melanocytes    Increased melanin pigment within the keratinocytes    Additional features depending on type of lentigo    In contrast, an ephelis (freckle) shows sun-induced increased melanin within the keratinocytes, without an increase in number of cells  What is the treatment for lentigines? Most lentigines are left alone  Attempts to lighten them may not be successful  The following approaches are used:   SPF 50+ broad-spectrum sunscreen    Hydroquinone bleaching cream    Alpha hydroxy acids    Vitamin C    Retinoids    Azelaic acid    Chemical peels  Individual lesions can be permanently removed using:   Cryotherapy    Intense pulsed light    Pigment lasers    How can lentigines be prevented?   Lentigines associated with exposure ultraviolet radiation can be prevented by very careful sun protection  Clothing is more successful at preventing new lentigines than are sunscreens  What is the outlook for lentigines? Lentigines usually persist  They may increase in number with age and sun exposure  Some in sun-protected sites may fade and disappear  EPHELIS ("FRECKLE")    Assessment and Plan:  Based on a thorough discussion of this condition and the management approach to it (including a comprehensive discussion of the known risks, side effects and potential benefits of treatment), the patient (family) agrees to implement the following specific plan:   No treatment     An ephelis is a benign freckle, a small, light brown or tan edie on the skin  They are typically found on sun exposed skin such as they face or on the backside of the forearms  The freckles darken in response to the sun and fade with decreased UV exposure  There can be hundreds or thousands of them on exposed skin  Ephelides are particularly common in fair-skinned children and are not present at birth  People with white skin that cannot tan often have red hair and numerous ephelides  Ephelides can also occur in other races that have dark brown or black hair  Ephelides are an inherited characteristic  People with many ephelides have at least one copy of a variant MC1R gene, which is the same variant that causes red hair  Freckles in non-Caucasians are associated with a different variant of the gene  The pigment-forming cells, melanocytes, produce more pigment than usual in ephelides  The pigment is packaged as melanosomes and distributed to surrounding keratinocytes  Ephelides increase in number following exposure to ultraviolet radiation in sunlight  Ephelides are found on sun-exposed sites, particularly the nose and cheeks   They are prominent during summer and fade during winter     Light to dark brown flat spots   3-10 mm in diameter   Multiple spots may merge into large patches   Any shape; often round    Ephelides are usually diagnosed by a skin examination  However, if there is uncertainty, then diagnosis can be made via skin biopsy with the following features:   An increase in pigment is noted in otherwise normal skin   There is no increase in the number of melanocytes (unlike lentigines)    As they are an inherited characteristic, they cannot be prevented  However, the summer darkening can be reduced by careful sun protection  They can be advised to protect affected areas using broad-spectrum, spf 50+ sunscreen   Cosmetic concealers can be applied to the freckles   Laser treatment may result in temporary lightening of the freckles    Often, ephelides become less prominent in adulthood  ACTINIC KERATOSIS    Assessment and Plan:  Based on a thorough discussion of this condition and the management approach to it (including a comprehensive discussion of the known risks, side effects and potential benefits of treatment), the patient (family) agrees to implement the following specific plan:     Discussed cryotherapy in office with signed consent     Actinic keratoses are very common on sites repeatedly exposed to the sun, especially the backs of the hands and the face, most often affecting the ears, nose, cheeks, upper lip, vermilion of the lower lip, temples, forehead and balding scalp  In severely chronically sun-damaged individuals, they may also be found on the upper trunk, upper and lower limbs, and dorsum of feet  We discussed the theoretical premalignant (pre-cancerous) nature and etiology of these growths  We discussed the prevailing notion that actinic keratoses are a reflection of abnormal skin cell development due to DNA damage by short wavelength UVB  They are more likely to appear if the immune function is poor, due to aging, recent sun exposure, predisposing disease or certain drugs      We discussed that the main concern is that actinic keratoses may predispose to squamous cell carcinoma  It is rare for a solitary actinic keratosis to evolve to squamous cell carcinoma (SCC), but the risk of SCC occurring at some stage in a patient with more than 10 actinic keratoses is thought to be about 10 to 15%  A tender, thickened, ulcerated or enlarging actinic keratosis is suspicious of SCC  Actinic keratoses may be prevented by strict sun protection  If already present, keratoses may improve with a very high sun protection factor (50+) broad-spectrum sunscreen applied at least daily to affected areas, year-round  We recommend that UPF-rated clothing and hats and sunglasses be worn whenever possible and that a sunscreen-moisturizer combination product such as Neutrogena Daily Defense be applied at least three times a day  We performed a thorough discussion of treatment options and specific risk/benefits/alternatives including but not limited to medical field treatment with medications such as the following:     Topical field area medications such as 5-fluorouracil or Aldara (specifically, the trouble with long-term compliance, blistering and local skin reaction versus the convenience of at-home therapy and that field therapy gets what is not yet seen)   Cryotherapy (specifically, local pain, scarring, dyspigmentation, blistering, possible superinfection, and treats only what we see versus directed treatment today)   Photodynamic therapy (specifically, local pain, scarring, dyspigmentation, blistering, possible superinfection, need to schedule for a later date, and time spent in the office versus field therapy that gets what is not yet seen)      MELANOCYTIC NEVI ("Moles")    Assessment and Plan:  Based on a thorough discussion of this condition and the management approach to it (including a comprehensive discussion of the known risks, side effects and potential benefits of treatment), the patient (family) agrees to implement the following specific plan:   Reassured benign     Melanocytic Nevi  Melanocytic nevi ("moles") are caused by collections of the color producing skin cells, or melanocytes, in 1 area in the skin  They can range in color from pink to dark brown and be either raised or flat  Some moles are present at birth (I e , "congenital nevi"), while others come up later in life (i e , "acquired nevi")  Beltran Littler exposure also stimulates the body to make more moles, ie the more sun you get the more moles you'll grow  Clinically distinguishing a healthy mole from melanoma may be difficult  The "ABCDE's" of moles have been suggested as a means of helping to alert a person to a suspicious mole and the possible increased risk of melanoma  Asymmetry: Healthy moles tend to be symmetric, while melanomas are often asymmetric  Asymmetry means if you draw a line through the mole, the two halves do not match in color, size, shape, or surface texture Any mole that starts to demonstrate "asymmetry" should be examined promptly by a board certified dermatologist      Border: Healthy moles tend to have discrete, even borders  The border of a melanoma often blends into the normal skin and does not sharply delineate the mole from normal skin  Any mole that starts to demonstrate "uneven borders" should be examined promptly     Color: Healthy moles tend to be one color throughout  Melanomas tend to be made up of different colors ranging from dark black, blue, white, or red  Any mole that demonstrates a color change should be examined promptly    Diameter: Healthy moles tend to be smaller than 0 6 cm in size; an exception are "congenital nevi" that can be larger  Melanomas tend to grow and can often be greater than 0 6 cm (1/4 of an inch, or the size of a pencil eraser)  This is only a guideline, and many normal moles may be larger than 0 6 cm without being unhealthy    Any mole that starts to change in size (small to bigger or bigger to smaller) should be examined promptly    Evolving: Healthy moles tend to "stay the same "  Melanomas may often show signs of change or evolution such as a change in size, shape, color, or elevation  Any mole that starts to itch, bleed, crust, burn, hurt, or ulcerate or demonstrate a change or evolution should be examined promptly by a board certified dermatologist       What are atypical moles or dysplastic nevi? Dysplastic moles are moles that have some of the ABCDE  changes listed above but  are not cancerous  Sometimes a biopsy and microscopic examination are needed to determine the difference  They may indicate an increased risk of melanoma in that person, especially if there is a family history of melanoma  What is a Melanoma? The main concern when looking at a new or changing mole it to evaluate whether it may be a melanoma  The appearance of a "new mole" remains one of the most reliable methods for identifying a malignant melanoma  A melanoma is a type of skin cancer that can be deadly if it spreads throughout the body  The prognosis of a Melanoma depends on how deep it has penetrated in the skin  If caught early, they generally will not have had time to grow into the deeper layers of the skin and they cure rate is then very high  Once the melanoma grows deeper into the skin, the cure rate drops dramatically  Therefore, early detection and removal of a malignant melanoma results in a much better chance of complete cure  VARICOSE VEINS    Assessment and Plan:  Based on a thorough discussion of this condition and the management approach to it (including a comprehensive discussion of the known risks, side effects and potential benefits of treatment), the patient (family) agrees to implement the following specific plan:   Discuss sclerotherapy for treatment     What are varicose veins? Varicose veins are engorged, tortuous, green, blue, or purple veins that are often found on the lower legs and feet    Varicose veins are also called varices or varicosities  Who gets varicose veins? Approximately one-third of men and women aged 18-64 years have varicose veins [1]  They are more common in women and those with a family history of venous disease  What causes varicose veins? In normal leg veins, one-way valves direct the flow of venous blood from superficial venules to larger superficial veins, then to the deep veins, and eventually to the heart  Muscle contractions create a pumping action to help the flow of blood to the heart (the venous return)  Risk factors for varicose veins   Obesity -- obesity increases venous reflux and venous pressure because of raised intra-abdominal pressure   Age -- varicose veins are increasingly common with age   Pregnancy -- the enlarged uterus causes increased intra-abdominal pressure and direct pressure on the iliac veins  Hormonal changes cause the valves and vessels to become more malleable   Prolonged standing -- increased hydrostatic pressures over time may cause venous distension and valve failure   Family history -- primary valvular failure is hereditary, and there is concordance in monozygotic twins in 75% of cases  What are the clinical features of varicose veins? Patients with varicose veins present because they are unsightly and because of a feeling of discomfort, heaviness, itching, or a dull ache  Patients can also present with complications such as bleeding, ulceration, and thrombophlebitis  What are the complications of varicose veins?  Bleeding: superficial veins are prone to trauma and can bleed, which can be potentially life-threatening   Ulceration: the increased pressure in varicose veins allows growth factors and circulating pro-inflammatory molecules to leak into the extravascular space, which leads to localized inflammation and the formation of a chronic venous leg ulcer     Thrombophlebitis: phlebitis is inflammation of the veins with erythema and painful induration of the affected veins  It is often associated with thrombosis (thrombophlebitis)  Thrombosis arises because of sluggish circulation of blood in the vein combined with a hypercoagulable state and may be superficial (superficial thrombophlebitis) or deep (deep vein thrombosis)   Venous stasis dermatitis: associated with increased venous pressure and pooling of inflammatory molecules  Patients present with brown discoloration, pruritus, and discoid or circumferential, acute or chronic eczema on the distal lower limbs   Lipodermatosclerosis: inflammatory and indurated form of panniculitis due to the presence of pro-inflammatory molecules  How are varicose veins diagnosed? Varicose veins are diagnosed clinically  A physical examination should include the entire venous system and is usually conducted with the patient lying down and standing up  [facility] uses the Clinical Etiological Anatomical Pathophysiological (CEAP) classification of varicose veins:  CEAP classification   C0 -- No visible or palpable signs of venous disease    C1 -- Telangiectasias or reticular veins    C2 -- Varicose veins; diameter > 3 mm    C3 -- Oedema    C4 -- Changes in skin and subcutaneous tissue: pigmentation, eczema, lipodermatosclerosis, or atrophie heath    C5 -- Healed venous ulcer    C6 -- Active venous ulcer    Duplex Doppler ultrasound assessment should be performed to determine the extent of disease and the level of truncal reflux [8] and to plan treatment options  What is the differential diagnosis for varicose veins? Varicose veins are larger than telangiectasia (small red 'thread' veins, < 1 mm in diameter) and venulectasia (blue reticular vessels, 1-3 mm in diameter)  These are not detected on duplex ultrasound  What is the treatment for varicose veins?   Weight loss (if overweight) and moderate physical activity should be encouraged in patients with varicose veins to reduce the risk of complications  Compression hosiery should be used to relieve discomfort and swelling, and especially when travelling  Treatment options for varicose veins are available from a vascular service   Endovenous thermal ablation: using a laser or radiofrequency device causes an irreversible thermal injury in the vein wall, which leads to scarring and absorption of the tissue over several months  The success rate for both different methods of ablation is 95%   Injection sclerotherapy: involves the injection of a sclerosant under ultrasound guidance to cause inflammation of the vessels of the wall and eventual collapse of the varicose network  Smaller surface veins can be injected by microsclerotherapy, involving smaller gauge needles with smaller amounts of sclerosant   Endovenous adhesive ablation: 'vein glue' technique involves the injection of medical grade adhesive cyanoacrylate glue through a catheter  Long term safety and efficacy data are lacking   Endovenous mechanicochemical ablation (ClariVein): a rotating occlusion catheter that mechanically agitates the vein lining while also spraying a liquid sclerosant  It induces more endothelial damage but lacks long term data   Surgery: high ligation, vein stripping and avulsion are less often used to treat varicose veins than in the past due to postoperative morbidity, recurrence rates, and the risks associated with anaesthesia and hospitalization   Lasers: telangiectasia and venulectasia can be treated with long wavelength vascular lasers, but these are unsuitable for larger varicose veins  What is the outcome for varicose veins? Whichever treatment option is used, varicose veins may recur and can be treated again      NEOPLASM OF UNCERTAIN BEHAVIOR OF SKIN    Assessment and Plan:   I have discussed with the patient that a sample of skin via a "skin biopsy would be potentially helpful to further make a specific diagnosis under the microscope   Based on a thorough discussion of this condition and the management approach to it (including a comprehensive discussion of the known risks, side effects and potential benefits of treatment), the patient (family) agrees to implement the following specific plan:    o Procedure:  Skin Biopsy  After a thorough discussion of treatment options and risk/benefits/alternatives (including but not limited to local pain, scarring, dyspigmentation, blistering, possible superinfection, and inability to confirm a diagnosis via histopathology), verbal and written consent were obtained and portion of the rash was biopsied for tissue sample  See below for consent that was obtained from patient and subsequent Procedure Note  INFORMED CONSENT DISCUSSION AND POST-OPERATIVE INSTRUCTIONS FOR PATIENT    I   RATIONALE FOR PROCEDURE  I understand that a skin biopsy allows the Dermatologist to test a lesion or rash under the microscope to obtain a diagnosis  It usually involves numbing the area with numbing medication and removing a small piece of skin; sometimes the area will be closed with sutures  In this specific procedure, sutures are not usually needed  If any sutures are placed, then they are usually need to be removed in 2 weeks or less  I understand that my Dermatologist recommends that a skin "shave" biopsy be performed today  A local anesthetic, similar to the kind that a dentist uses when filling a cavity, will be injected with a very small needle into the skin area to be sampled  The injected skin and tissue underneath "will go to sleep and become numb so no pain should be felt afterwards  An instrument shaped like a tiny "razor blade" (shave biopsy instrument) will be used to cut a small piece of tissue and skin from the area so that a sample of tissue can be taken and examined more closely under the microscope    A slight amount of bleeding will occur, but it will be stopped with direct pressure and a pressure bandage and any other appropriate methods  I understands that a scar will form where the wound was created  Surgical ointment will be applied to help protect the wound  Sutures are not usually needed  II   RISKS AND POTENTIAL COMPLICATIONS   I understand the risks and potential complications of a skin biopsy include but are not limited to the following:   Bleeding   Infection   Pain   Scar/keloid   Skin discoloration   Incomplete Removal   Recurrence   Nerve Damage/Numbness/Loss of Function   Allergic Reaction to Anesthesia   Biopsies are diagnostic procedures and based on findings additional treatment or evaluation may be required   Loss or destruction of specimen resulting in no additional findings    My Dermatologist has explained to me the nature of the condition, the nature of the procedure, and the benefits to be reasonably expected compared with alternative approaches  My Dermatologist has discussed the likelihood of major risks or complications of this procedure including the specific risks listed above, such as bleeding, infection, and scarring/keloid  I understand that a scar is expected after this procedure  I understand that my physician cannot predict if the scar will form a "keloid," which extends beyond the borders of the wound that is created  A keloid is a thick, painful, and bumpy scar  A keloid can be difficult to treat, as it does not always respond well to therapy, which includes injecting cortisone directly into the keloid every few weeks  While this usually reduces the pain and size of the scar, it does not eliminate it  I understand that photographs may be taken before and after the procedure  These will be maintained as part of the medical providers confidential records and may not be made available to me    I further authorize the medical provider to use the photographs for teaching purposes or to illustrate scientific papers, books, or lectures if in his/her judgment, medical research, education, or science may benefit from its use  I have had an opportunity to fully inquire about the risks and benefits of this procedure and its alternatives  I have been given ample time and opportunity to ask questions and to seek a second opinion if I wished to do so  I acknowledge that there have specifically been no guarantees as to the cosmetic results from the procedure  I am aware that with any procedure there is always the possibility of an unexpected complication  III  POST-PROCEDURAL CARE (WHAT YOU WILL NEED TO DO "AFTER THE BIOPSY" TO OPTIMIZE HEALING)     Keep the area clean and dry  Try NOT to remove the bandage or get it wet for the first 24 hours   Gently clean the area and apply surgical ointment (such as Vaseline petrolatum ointment, which is available "over the counter" and not a prescription) to the biopsy site for up to 2 weeks straight  This acts to protect the wound from the outside world   Sutures are not usually placed in this procedure  If any sutures were placed, return for suture removal as instructed (generally 1 week for the face, 2 weeks for the body)   Take Acetaminophen (Tylenol) for discomfort, if no contraindications  Ibuprofen or aspirin could make bleeding worse   Call our office immediately for signs of infection: fever, chills, increased redness, warmth, tenderness, discomfort/pain, or pus or foul smell coming from the wound  WHAT TO DO IF THERE IS ANY BLEEDING? If a small amount of bleeding is noticed, place a clean cloth over the area and apply firm pressure for ten minutes  Check the wound after 10 minutes of direct pressure  If bleeding persists, try one more time for an additional 10 minutes of direct pressure on the area    If the bleeding becomes heavier or does not stop after the second attempt, or if you have any other questions about this procedure, then please call your [facility] by calling [phone] ([phone])  I hereby acknowledge that I have reviewed and verified the site with my Dermatologist and have requested and authorized my Dermatologist to proceed with the procedure

## 2021-10-14 DIAGNOSIS — R51.9 NONINTRACTABLE HEADACHE, UNSPECIFIED CHRONICITY PATTERN, UNSPECIFIED HEADACHE TYPE: ICD-10-CM

## 2021-10-14 RX ORDER — SUMATRIPTAN 100 MG/1
100 TABLET, FILM COATED ORAL ONCE AS NEEDED
Qty: 9 TABLET | Refills: 3 | Status: SHIPPED | OUTPATIENT
Start: 2021-10-14 | End: 2022-01-13

## 2021-10-22 ENCOUNTER — OFFICE VISIT (OUTPATIENT)
Dept: FAMILY MEDICINE CLINIC | Facility: CLINIC | Age: 51
End: 2021-10-22
Payer: COMMERCIAL

## 2021-10-22 VITALS
SYSTOLIC BLOOD PRESSURE: 124 MMHG | WEIGHT: 152 LBS | TEMPERATURE: 97.1 F | BODY MASS INDEX: 23.86 KG/M2 | OXYGEN SATURATION: 99 % | DIASTOLIC BLOOD PRESSURE: 82 MMHG | HEIGHT: 67 IN | HEART RATE: 69 BPM

## 2021-10-22 DIAGNOSIS — L03.031 PARONYCHIA OF GREAT TOE, RIGHT: Primary | ICD-10-CM

## 2021-10-22 PROCEDURE — 99213 OFFICE O/P EST LOW 20 MIN: CPT | Performed by: FAMILY MEDICINE

## 2021-10-22 PROCEDURE — 3725F SCREEN DEPRESSION PERFORMED: CPT | Performed by: FAMILY MEDICINE

## 2021-10-22 PROCEDURE — 3008F BODY MASS INDEX DOCD: CPT | Performed by: FAMILY MEDICINE

## 2021-10-22 PROCEDURE — 1036F TOBACCO NON-USER: CPT | Performed by: FAMILY MEDICINE

## 2021-11-17 ENCOUNTER — PROCEDURE VISIT (OUTPATIENT)
Dept: DERMATOLOGY | Facility: CLINIC | Age: 51
End: 2021-11-17
Payer: COMMERCIAL

## 2021-11-17 VITALS — BODY MASS INDEX: 24.59 KG/M2 | WEIGHT: 157 LBS

## 2021-11-17 DIAGNOSIS — C44.519 BASAL CELL CARCINOMA (BCC) OF SKIN OF OTHER PART OF TORSO: Primary | ICD-10-CM

## 2021-11-17 PROCEDURE — 11602 EXC TR-EXT MAL+MARG 1.1-2 CM: CPT | Performed by: DERMATOLOGY

## 2021-11-17 PROCEDURE — 12032 INTMD RPR S/A/T/EXT 2.6-7.5: CPT | Performed by: DERMATOLOGY

## 2021-11-17 PROCEDURE — 88305 TISSUE EXAM BY PATHOLOGIST: CPT | Performed by: PATHOLOGY

## 2021-12-02 ENCOUNTER — OFFICE VISIT (OUTPATIENT)
Dept: DERMATOLOGY | Facility: CLINIC | Age: 51
End: 2021-12-02

## 2021-12-02 DIAGNOSIS — L24.89 IRRITANT CONTACT DERMATITIS DUE TO OTHER AGENTS: Primary | ICD-10-CM

## 2021-12-02 DIAGNOSIS — Z48.02 ENCOUNTER FOR REMOVAL OF SUTURES: Primary | ICD-10-CM

## 2021-12-02 PROCEDURE — RECHECK: Performed by: DERMATOLOGY

## 2021-12-02 RX ORDER — TRIAMCINOLONE ACETONIDE 1 MG/G
CREAM TOPICAL 2 TIMES DAILY
Qty: 15 G | Refills: 0 | Status: SHIPPED | OUTPATIENT
Start: 2021-12-02

## 2022-01-10 ENCOUNTER — TELEPHONE (OUTPATIENT)
Dept: FAMILY MEDICINE CLINIC | Facility: CLINIC | Age: 52
End: 2022-01-10

## 2022-01-10 NOTE — TELEPHONE ENCOUNTER
Patient called stating that she had been diagnosed with COVID 17 days ago and she is now having some issues  She is starting to have pain in her ribs and this is new  It started after she got COVID and it has been getting worse    Would like to know what you suggest

## 2022-01-13 ENCOUNTER — OFFICE VISIT (OUTPATIENT)
Dept: FAMILY MEDICINE CLINIC | Facility: CLINIC | Age: 52
End: 2022-01-13
Payer: COMMERCIAL

## 2022-01-13 VITALS
BODY MASS INDEX: 24.17 KG/M2 | TEMPERATURE: 98.9 F | RESPIRATION RATE: 16 BRPM | HEART RATE: 64 BPM | OXYGEN SATURATION: 99 % | SYSTOLIC BLOOD PRESSURE: 118 MMHG | DIASTOLIC BLOOD PRESSURE: 78 MMHG | HEIGHT: 67 IN | WEIGHT: 154 LBS

## 2022-01-13 DIAGNOSIS — R07.81 RIB PAIN: Primary | ICD-10-CM

## 2022-01-13 DIAGNOSIS — U07.1 COVID-19 VIRUS INFECTION: ICD-10-CM

## 2022-01-13 DIAGNOSIS — Z13.6 SCREENING FOR CARDIOVASCULAR CONDITION: ICD-10-CM

## 2022-01-13 DIAGNOSIS — R53.83 FATIGUE, UNSPECIFIED TYPE: ICD-10-CM

## 2022-01-13 PROBLEM — L03.031 PARONYCHIA OF GREAT TOE, RIGHT: Status: RESOLVED | Noted: 2021-10-22 | Resolved: 2022-01-13

## 2022-01-13 PROCEDURE — 99214 OFFICE O/P EST MOD 30 MIN: CPT | Performed by: FAMILY MEDICINE

## 2022-01-13 NOTE — PROGRESS NOTES
Assessment/Plan:         Problem List Items Addressed This Visit        Other    Rib pain - Primary     Pt likely has muscle strain  Pt told to take NSAID and avoid repetitive bending and lifting until sxs better  Pt to call if persists  Relevant Orders    C-reactive protein    Fatigue     Will check labs  Relevant Orders    CBC and differential    Comprehensive metabolic panel    TSH, 3rd generation with Free T4 reflex    COVID-19 virus infection     Pt tested positive on 12/25/21  Doing better now  Gets occasional headache and fatigue  Other Visit Diagnoses     Screening for cardiovascular condition        Relevant Orders    Lipid panel            Subjective:      Patient ID: Preston Aragon is a 46 y o  female  Pt here for f/u COVID infection  Pt tested positive with home kit on 12/25  Pt had fatigue, HA, and cough  No breathing issues  Recently, pt has had pain off and on in L lower rib cage  No injury known  Gets it a few times a day and lasts a few secs  No sob and O2 sat is 99%  The following portions of the patient's history were reviewed and updated as appropriate:   Past Medical History:  She has a past medical history of Basal cell carcinoma (11/17/2021) and COVID-19 ,  _______________________________________________________________________  Medical Problems:  does not have any pertinent problems on file ,  _______________________________________________________________________  Past Surgical History:   has a past surgical history that includes [place] tooth extraction  ,  _______________________________________________________________________  Family History:  family history includes Bipolar disorder in her maternal uncle; Colon cancer (age of onset: 79) in her paternal aunt; Diabetes in her paternal grandfather; Hyperlipidemia in her father; Hypertension in her father; Prostate cancer in her father; Skin cancer in her father and mother; Stroke in her maternal grandmother ,  _______________________________________________________________________  Social History:   reports that she has never smoked  She has never used smokeless tobacco  She reports that she does not drink alcohol and does not use drugs  ,  _______________________________________________________________________  Allergies:  has No Known Allergies     _______________________________________________________________________  Current Outpatient Medications   Medication Sig Dispense Refill    triamcinolone (KENALOG) 0 1 % cream Apply topically 2 (two) times a day To the surrounding skin around the right upper arm scar for 2 weeks or until cleared  15 g 0     No current facility-administered medications for this visit      _______________________________________________________________________  Review of Systems   Constitutional: Negative for fatigue and unexpected weight change  Respiratory: Negative for cough, chest tightness and shortness of breath  Cardiovascular: Negative for chest pain and palpitations  Gastrointestinal: Negative for abdominal pain, constipation, diarrhea, nausea and vomiting  Genitourinary: Negative for difficulty urinating  Musculoskeletal: Negative for arthralgias  L ribcage pain   Skin: Negative for rash  Neurological: Negative for dizziness and headaches  Objective:  Vitals:    01/13/22 0822 01/13/22 0854   BP: 134/82 118/78   BP Location: Left arm Left arm   Patient Position: Sitting Sitting   Cuff Size:  Standard   Pulse: 64    Resp: 16    Temp: 98 9 °F (37 2 °C)    SpO2: 99%    Weight: 69 9 kg (154 lb)    Height: 5' 7" (1 702 m)      Body mass index is 24 12 kg/m²  Physical Exam  Vitals and nursing note reviewed  Constitutional:       Appearance: Normal appearance  She is well-developed  HENT:      Head: Normocephalic and atraumatic  Cardiovascular:      Rate and Rhythm: Normal rate and regular rhythm  Heart sounds: Normal heart sounds   No murmur heard  Pulmonary:      Effort: Pulmonary effort is normal  No respiratory distress  Breath sounds: Normal breath sounds  No wheezing  Musculoskeletal:      Cervical back: Normal range of motion and neck supple  Right lower leg: No edema  Left lower leg: No edema  Comments: TTP L lower lateral ribcage   Lymphadenopathy:      Cervical: No cervical adenopathy  Neurological:      Mental Status: She is alert and oriented to person, place, and time  Psychiatric:         Mood and Affect: Mood normal          Behavior: Behavior normal          Thought Content:  Thought content normal          Judgment: Judgment normal

## 2022-01-13 NOTE — ASSESSMENT & PLAN NOTE
Pt likely has muscle strain  Pt told to take NSAID and avoid repetitive bending and lifting until sxs better  Pt to call if persists

## 2022-01-18 ENCOUNTER — OFFICE VISIT (OUTPATIENT)
Dept: NEUROLOGY | Facility: CLINIC | Age: 52
End: 2022-01-18
Payer: COMMERCIAL

## 2022-01-18 ENCOUNTER — TELEPHONE (OUTPATIENT)
Dept: NEUROLOGY | Facility: CLINIC | Age: 52
End: 2022-01-18

## 2022-01-18 VITALS
DIASTOLIC BLOOD PRESSURE: 88 MMHG | HEART RATE: 78 BPM | HEIGHT: 67 IN | TEMPERATURE: 97.7 F | SYSTOLIC BLOOD PRESSURE: 130 MMHG | WEIGHT: 157 LBS | BODY MASS INDEX: 24.64 KG/M2

## 2022-01-18 DIAGNOSIS — M54.2 CERVICALGIA: ICD-10-CM

## 2022-01-18 DIAGNOSIS — R51.9 NONINTRACTABLE HEADACHE, UNSPECIFIED CHRONICITY PATTERN, UNSPECIFIED HEADACHE TYPE: ICD-10-CM

## 2022-01-18 DIAGNOSIS — G44.209 TENSION HEADACHE: Primary | ICD-10-CM

## 2022-01-18 PROCEDURE — 99214 OFFICE O/P EST MOD 30 MIN: CPT | Performed by: PSYCHIATRY & NEUROLOGY

## 2022-01-18 PROCEDURE — 1036F TOBACCO NON-USER: CPT | Performed by: PSYCHIATRY & NEUROLOGY

## 2022-01-18 PROCEDURE — 3008F BODY MASS INDEX DOCD: CPT | Performed by: PSYCHIATRY & NEUROLOGY

## 2022-01-18 RX ORDER — SUMATRIPTAN 100 MG/1
100 TABLET, FILM COATED ORAL ONCE AS NEEDED
COMMUNITY
End: 2022-01-18 | Stop reason: SDUPTHER

## 2022-01-18 RX ORDER — CYCLOBENZAPRINE HCL 10 MG
10 TABLET ORAL 3 TIMES DAILY PRN
Qty: 30 TABLET | Refills: 1 | Status: SHIPPED | OUTPATIENT
Start: 2022-01-18

## 2022-01-18 RX ORDER — SUMATRIPTAN 100 MG/1
100 TABLET, FILM COATED ORAL ONCE AS NEEDED
Qty: 9 TABLET | Refills: 6 | Status: SHIPPED | OUTPATIENT
Start: 2022-01-18

## 2022-01-18 RX ORDER — IBUPROFEN 200 MG
TABLET ORAL EVERY 6 HOURS PRN
COMMUNITY

## 2022-01-18 NOTE — TELEPHONE ENCOUNTER
pt called and states that she tested + for COVID 12/25/21 and since then has had a lot more headaches  these headaches feel different than her normal migrianes, having a lot of pressure in her head  sumatriptan takes the edge off a little bit  also tried ibuprofen, this helps at times  has had 6 migraines sine 12/25 and typically she would have 3 migraines a month  Not taking any daily migraine meds Takes b complex daily  And sometimes take MVI and mag  States that she is trying to figure out which meds are more effective  She states that she may want to do imaging to see if there is something going on       I scheduled pt to see dr Adams Boxer today at 2pm    [phone] to leave detailed message

## 2022-01-18 NOTE — PATIENT INSTRUCTIONS
Take flexeril as needed up to 3 times a day for the increased muscle tension  If continues after 2 weeks we can do PT and/or steroid course, let us know and call us back

## 2022-01-18 NOTE — ASSESSMENT & PLAN NOTE
59-year-old female with 30 year history of chronic migraines not on a preventative medication using Imitrex 2 times monthly on average with recent increase and characteristic change to her headaches in the setting of COVID-19 infection  Her typical headaches are retro-orbital however this new headache is bilateral base of the skull muscle tension  Sumatriptan has not been helping for this headache however ice and stretching has been somewhat helpful  Exam is nonfocal however there is notable significant muscle tension at the skull base bilaterally with tenderness to palpation  It will trial a course of muscle relaxants and if symptoms are not improved after 1-2 weeks Tessy Santo will let us know and we can then attempt a course of physical therapy and/or steroids  She recalls that there is any changes or concerning changes  No need for updated head imaging at this time headache is consistent with musculoskeletal complaints s/p COVID  - Sent Flexeril 10 milligrams t i d  P r n   To her pharmacy  - Refilled her normal Imitrex prescription  - Return to clinic in 1 year unless other changes  - continue with ice and stretching therapy  - Call us in 1-2 weeks if symptoms are unchanged and requires different medical management at which time we will consider PT/steroid

## 2022-01-18 NOTE — LETTER
January 18, 2022     Patient: Annita Bear   YOB: 1970   Date of Visit: 1/18/2022       To Whom it May Concern:    Annita Bear is under my professional care  She was seen in my office on 1/18/2022  She is advised not to travel for the next 2 months (60 days) due to ongoing health concerns  If you have any questions or concerns, please don't hesitate to call           Sincerely,          Adwoa Britton MD        CC: No Recipients

## 2022-01-18 NOTE — PROGRESS NOTES
Patient ID: Elvira Wolfe is a 46 y o  female  Assessment/Plan:    Tension headache s/p COVID-23 60-year-old female with 30 year history of chronic migraines not on a preventative medication using Imitrex 2 times monthly on average with recent increase and characteristic change to her headaches in the setting of COVID-19 infection  Her typical headaches are retro-orbital however this new headache is bilateral base of the skull muscle tension  Sumatriptan has not been helping for this headache however ice and stretching has been somewhat helpful  Exam is nonfocal however there is notable significant muscle tension at the skull base bilaterally with tenderness to palpation  It will trial a course of muscle relaxants and if symptoms are not improved after 1-2 weeks Guillermo Vargastiago will let us know and we can then attempt a course of physical therapy and/or steroids  She recalls that there is any changes or concerning changes  No need for updated head imaging at this time headache is consistent with musculoskeletal complaints s/p COVID  - Sent Flexeril 10 milligrams t i d  P r n  To her pharmacy  - Refilled her normal Imitrex prescription  - Return to clinic in 1 year unless other changes  - continue with ice and stretching therapy  - Call us in 1-2 weeks if symptoms are unchanged and requires different medical management at which time we will consider PT/steroid       Diagnoses and all orders for this visit:    Tension headache  -     cyclobenzaprine (FLEXERIL) 10 mg tablet; Take 1 tablet (10 mg total) by mouth 3 (three) times a day as needed for muscle spasms    Nonintractable headache, unspecified chronicity pattern, unspecified headache type  -     SUMAtriptan (IMITREX) 100 mg tablet; Take 1 tablet (100 mg total) by mouth once as needed for migraine    Other orders  -     Discontinue: SUMAtriptan (IMITREX) 100 mg tablet;  Take 100 mg by mouth once as needed for migraine  -     ibuprofen (MOTRIN) 200 mg tablet; Take by mouth every 6 (six) hours as needed for mild pain           Subjective:    Percy Foster is presenting for follow up on chronic migraine with recent changes due to COVID    Relevant medical history includes: anxiety, FH significant for migraine in her mother, grandmother and migraine with aura in her sister      First evaluation was 1/19/21     To review, Percy Foster presented for evaluation of headache  She had migraines since she was a teenager and was previously on several abortive meds such as imitrex, and other OTC options  She reported a gradual increase in frequency over the last year and felt they were exacerbated by stress, certain foods, weather but denied any auras  At her last visit, she wanted to continue on imitrex and ibuprofen and deferred imaging unless there was no improvement  She was previously requiring 2 imitrex per month and headaches were stable      Previous medications:  - tylenol, ibuprofen     Current medications:  - imitrex 100mg  - motrin 200mg     Interval history as of 01/18/22, unfortunately Deloris had COVID around [place] time and has had a significant change in her headaches since that time  She reports that her headaches are a feeling of tension in the base of her skull bilaterally and sumatriptan is not working for these headaches  She has taken 6 sumatriptan since [place] which is a significant increase over her normal 2  She did also have musculoskeletal tension and tightness in her abdomen in the setting of COVID but this is now resolved  She reports ice packs and stretching the head to the side are somewhat helpful to relieve the pain  She has not tried muscle relaxants or physical therapy or steroids  She is not on a daily preventative medication              The following portions of the patient's history were reviewed and updated as appropriate: allergies, current medications, past family history, past medical history, past social history, past surgical history and problem list          Objective:    Blood pressure 130/88, pulse 78, temperature 97 7 °F (36 5 °C), height 5' 7" (1 702 m), weight 71 2 kg (157 lb)  Physical Exam  Vitals and nursing note reviewed  Constitutional:       General: She is not in acute distress  Appearance: She is well-developed  Eyes:      General: Lids are normal       Extraocular Movements: Extraocular movements intact  Pupils: Pupils are equal, round, and reactive to light  Cardiovascular:      Rate and Rhythm: Normal rate and regular rhythm  Heart sounds: Normal heart sounds  Pulmonary:      Effort: Pulmonary effort is normal       Breath sounds: Normal breath sounds  Musculoskeletal:         General: Normal range of motion  Cervical back: Normal range of motion and neck supple  Muscular tenderness present  Skin:     General: Skin is warm  Capillary Refill: Capillary refill takes less than 2 seconds  Neurological:      General: No focal deficit present  Coordination: Coordination is intact  Deep Tendon Reflexes: Strength normal and reflexes are normal and symmetric  Psychiatric:         Mood and Affect: Mood normal          Speech: Speech normal          Behavior: Behavior normal          Neurological Exam  Mental Status  Awake, alert and oriented to person, place and time  Recent and remote memory are intact  Speech is normal  Language is fluent with no aphasia  Attention and concentration are normal  Fund of knowledge is appropriate for level of education  Cranial Nerves  CN II: Visual fields full to confrontation  CN III, IV, VI: Extraocular movements intact bilaterally  Normal lids and orbits bilaterally  Pupils equal round and reactive to light bilaterally  CN V: Facial sensation is normal   CN VII: Full and symmetric facial movement    CN VIII: Hearing is normal   CN IX, X: Palate elevates symmetrically  CN XI: Shoulder shrug strength is normal   CN XII: Tongue midline without atrophy or fasciculations  Motor  Normal muscle bulk throughout  No fasciculations present  Normal muscle tone  No abnormal involuntary movements  Strength is 5/5 throughout all four extremities  Sensory  Sensation is intact to light touch, pinprick, vibration and proprioception in all four extremities  Reflexes  Deep tendon reflexes are 2+ and symmetric in all four extremities with downgoing toes bilaterally  Coordination  Finger-to-nose, rapid alternating movements and heel-to-shin normal bilaterally without dysmetria  Gait  Normal casual, toe, heel and tandem gait  ROS:    Review of Systems   Constitutional: Negative  Negative for appetite change and fever  HENT: Negative  Negative for hearing loss, tinnitus, trouble swallowing and voice change  Eyes: Negative  Negative for photophobia and pain  Respiratory: Negative  Negative for shortness of breath  Cardiovascular: Negative  Negative for palpitations  Gastrointestinal: Negative  Negative for nausea and vomiting  Endocrine: Negative  Negative for cold intolerance  Genitourinary: Negative  Negative for dysuria, frequency and urgency  Musculoskeletal: Negative  Negative for myalgias and neck pain  Gets side stitches since covid   Skin: Negative  Negative for rash  Neurological: Positive for headaches  Negative for dizziness, tremors, seizures, syncope, facial asymmetry, speech difficulty, weakness, light-headedness and numbness  Headaches have gotten more frequent, neck related since covid   Hematological: Negative  Does not bruise/bleed easily  Psychiatric/Behavioral: Negative  Negative for confusion, hallucinations and sleep disturbance  All other systems reviewed and are negative        Review of systems as documented by the MA was reviewed in full by myself, Monse Ibarra MD      More than 50% of this time spent with the patient was devoted to counseling and coordination of care  Issues addressed during this clinic visit included overall management, medication counseling or monitoring (including adverse effects, side effects and risks of medications)

## 2022-01-31 DIAGNOSIS — E78.00 ELEVATED CHOLESTEROL: Primary | ICD-10-CM

## 2022-10-13 DIAGNOSIS — R51.9 NONINTRACTABLE HEADACHE, UNSPECIFIED CHRONICITY PATTERN, UNSPECIFIED HEADACHE TYPE: ICD-10-CM

## 2022-10-13 RX ORDER — SUMATRIPTAN 100 MG/1
TABLET, FILM COATED ORAL
Qty: 9 TABLET | Refills: 6 | Status: SHIPPED | OUTPATIENT
Start: 2022-10-13

## 2023-05-22 DIAGNOSIS — R51.9 NONINTRACTABLE HEADACHE, UNSPECIFIED CHRONICITY PATTERN, UNSPECIFIED HEADACHE TYPE: ICD-10-CM

## 2023-05-22 RX ORDER — SUMATRIPTAN 100 MG/1
TABLET, FILM COATED ORAL
Qty: 9 TABLET | Refills: 6 | Status: SHIPPED | OUTPATIENT
Start: 2023-05-22

## 2023-08-29 ENCOUNTER — OFFICE VISIT (OUTPATIENT)
Dept: FAMILY MEDICINE CLINIC | Facility: CLINIC | Age: 53
End: 2023-08-29
Payer: COMMERCIAL

## 2023-08-29 VITALS
HEART RATE: 82 BPM | WEIGHT: 155 LBS | DIASTOLIC BLOOD PRESSURE: 80 MMHG | HEIGHT: 67 IN | SYSTOLIC BLOOD PRESSURE: 130 MMHG | TEMPERATURE: 97.2 F | BODY MASS INDEX: 24.33 KG/M2 | RESPIRATION RATE: 18 BRPM | OXYGEN SATURATION: 99 %

## 2023-08-29 DIAGNOSIS — Z13.6 ENCOUNTER FOR LIPID SCREENING FOR CARDIOVASCULAR DISEASE: ICD-10-CM

## 2023-08-29 DIAGNOSIS — Z13.220 ENCOUNTER FOR LIPID SCREENING FOR CARDIOVASCULAR DISEASE: ICD-10-CM

## 2023-08-29 DIAGNOSIS — Z13.1 ENCOUNTER FOR SCREENING FOR DIABETES MELLITUS: ICD-10-CM
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
## 2023-08-29 DIAGNOSIS — S30.861S TICK BITE OF ABDOMINAL WALL, SEQUELA: ICD-10-CM

## 2023-08-29 DIAGNOSIS — R20.2 PARESTHESIA OF BOTH LOWER EXTREMITIES: Primary | ICD-10-CM

## 2023-08-29 DIAGNOSIS — M65.4 RADIAL STYLOID TENOSYNOVITIS OF RIGHT HAND: ICD-10-CM

## 2023-08-29 PROCEDURE — 99214 OFFICE O/P EST MOD 30 MIN: CPT | Performed by: FAMILY MEDICINE

## 2023-08-29 NOTE — PROGRESS NOTES
Subjective:      Patient ID: Michele Nicole is a 48 y.o. female. 8/12- went to [place], got take out- shrimp [place] from [place], no one else was sick, ate at 7 pm, at 1 am- nausea, diarrhea for 5 hours. 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 with hyerextension or abduction of the thumb  History of tick bite over the summer- end of June, unsure how long she had the tick attached, was on abdomen        Past Medical History:   Diagnosis Date   • Basal cell carcinoma 11/17/2021    Right upper arm    • COVID-19        Family History   Problem Relation Age of Onset   • Skin cancer Mother    • Migraines Mother    • Prostate cancer Father    • Hypertension Father    • Hyperlipidemia Father    • Skin cancer Father    • Stroke Maternal Grandmother    • Migraines Maternal Grandmother    • Diabetes Paternal Grandfather    • Bipolar disorder Maternal Uncle    • Colon cancer Paternal Aunt 79   • Migraines Sister        Past Surgical History:   Procedure Laterality Date   • WISDOM TOOTH EXTRACTION          reports that she has never smoked. She has never used smokeless tobacco. She reports that she does not drink alcohol and does not use drugs.       Current Outpatient Medications:   •  ibuprofen (MOTRIN) 200 mg tablet, Take by mouth every 6 (six) hours as needed for mild pain, Disp: , Rfl:   •  SUMAtriptan (IMITREX) 100 mg tablet, TAKE 1 TABLET BY MOUTH ONCE AS NEEDED FOR MIGRAINE, Disp: 9 tablet, Rfl: 6  •  cyclobenzaprine (FLEXERIL) 10 mg tablet, Take 1 tablet (10 mg total) by mouth 3 (three) times a day as needed for muscle spasms (Patient not taking: Reported on 8/29/2023), Disp: 30 tablet, Rfl: 1  •  triamcinolone (KENALOG) 0.1 % cream, Apply topically 2 (two) times a day To the surrounding skin around the right upper arm scar for 2 weeks or until cleared. (Patient not taking: Reported on 1/18/2022), Disp: 15 g, Rfl: 0    The following portions of the patient's history were reviewed and updated as appropriate: allergies, current medications, past family history, past medical history, past social history, past surgical history and problem list.    Review of Systems   Constitutional: Negative for fatigue and fever. HENT: Negative for congestion, facial swelling, mouth sores, rhinorrhea, sore throat and trouble swallowing. Eyes: Negative for pain and redness. Respiratory: Negative for cough, shortness of breath and wheezing. Cardiovascular: Negative for chest pain, palpitations and leg swelling. Gastrointestinal: Negative for abdominal pain, blood in stool, constipation, diarrhea and nausea. Genitourinary: Negative for dysuria, hematuria and urgency. Musculoskeletal: Positive for arthralgias. Negative for back pain and myalgias. Skin: Negative for rash and wound. Neurological: Positive for numbness (pins and needless bilateral LE). Negative for seizures, syncope and headaches. Hematological: Negative for adenopathy. Psychiatric/Behavioral: Negative for agitation and behavioral problems. PHQ-2/9 Depression Screening    Little interest or pleasure in doing things: 0 - not at all  Feeling down, depressed, or hopeless: 0 - not at all  PHQ-2 Score: 0  PHQ-2 Interpretation: Negative depression screen             Objective:    /80 (BP Location: Left arm, Patient Position: Sitting, Cuff Size: Adult)   Pulse 82   Temp (!) 97.2 °F (36.2 °C) (Tympanic)   Resp 18   Ht 5' 7" (1.702 m)   Wt 70.3 kg (155 lb)   SpO2 99%   BMI 24.28 kg/m²      Physical Exam  Vitals and nursing note reviewed. Constitutional:       Appearance: Normal appearance. She is well-developed. She is not ill-appearing. HENT:      Head: Normocephalic and atraumatic.       Right Ear: External ear normal.      Left Ear: External ear normal. Nose: Nose normal.      Mouth/Throat:      Mouth: Mucous membranes are moist.      Pharynx: No oropharyngeal exudate or posterior oropharyngeal erythema. Eyes:      General: No scleral icterus. Right eye: No discharge. Left eye: No discharge. Conjunctiva/sclera: Conjunctivae normal.   Cardiovascular:      Rate and Rhythm: Normal rate. Pulses:           Dorsalis pedis pulses are 2+ on the right side and 2+ on the left side. Heart sounds: No murmur heard. No gallop. Pulmonary:      Effort: Pulmonary effort is normal. No respiratory distress. Breath sounds: Normal breath sounds. No stridor. No wheezing, rhonchi or rales. Abdominal:      Palpations: Abdomen is soft. Tenderness: There is no abdominal tenderness. Musculoskeletal:         General: Tenderness present. No deformity. Right lower leg: No edema. Left lower leg: No edema. Right foot: Normal range of motion. No deformity, bunion, Charcot foot or foot drop. Left foot: Normal range of motion. No deformity, bunion, Charcot foot, foot drop or prominent metatarsal heads. Comments: POSITIVE FINKELSTEINS TEST     Feet:      Right foot:      Skin integrity: Skin integrity normal.      Toenail Condition: Right toenails are normal.      Left foot:      Skin integrity: Skin integrity normal.      Toenail Condition: Left toenails are normal.   Skin:     Findings: No erythema or rash. Neurological:      Mental Status: She is alert. Mental status is at baseline.    Psychiatric:         Behavior: Behavior normal.         Judgment: Judgment normal.           Assessment/Plan:  Problem List Items Addressed This Visit    None  Visit Diagnoses     Paresthesia of both lower extremities    -  Primary    Relevant Orders    CBC and differential    Comprehensive metabolic panel    Vitamin B12    Folate    Vitamin D 25 hydroxy    Magnesium    Tick bite of abdominal wall, sequela        Relevant Orders    Lyme Disease Serology w/Reflex    Encounter for lipid screening for cardiovascular disease        Relevant Orders    Lipid panel    Encounter for screening for diabetes mellitus        Relevant Orders    Hemoglobin A1C    Radial styloid tenosynovitis of right hand              POSSIBLE magnesium or b12 deficiency, recommend obtaining labs as above  Given history of tick bite, check for Lyme disease serology. Recommend thumb spica splint for the right thumb pain. Depression Screening and Follow-up Plan: Patient was screened for depression during today's encounter. They screened negative with a PHQ-2 score of 0. Read package inserts for all medications before starting a new medications, call me if you have any questions. Patient was given opportunity to ask questions and all questions were answered. Disclaimer: Portions of the record may have been created with voice recognition software. Occasional wrong word or "sound a like" substitutions may have occurred due to the inherent limitations of voice recognition software. Read the chart carefully and recognize, using context, where substitutions have occurred. I have used the Epic copy/forward function to compose this note. I have reviewed my current note to ensure it reflects the current patient status, exam, assessment and plan.

## 2023-08-30 LAB — HBA1C MFR BLD HPLC: 5.3 %

## 2023-08-31 ENCOUNTER — HOSPITAL ENCOUNTER (EMERGENCY)
Facility: HOSPITAL | Age: 53
Discharge: HOME | End: 2023-08-31
Attending: EMERGENCY MEDICINE
Payer: COMMERCIAL

## 2023-08-31 ENCOUNTER — TELEPHONE (OUTPATIENT)
Dept: FAMILY MEDICINE CLINIC | Facility: CLINIC | Age: 53
End: 2023-08-31

## 2023-08-31 VITALS
TEMPERATURE: 97.7 F | WEIGHT: 160.72 LBS | OXYGEN SATURATION: 98 % | SYSTOLIC BLOOD PRESSURE: 151 MMHG | HEART RATE: 71 BPM | DIASTOLIC BLOOD PRESSURE: 69 MMHG | RESPIRATION RATE: 18 BRPM | BODY MASS INDEX: 25.17 KG/M2

## 2023-08-31 DIAGNOSIS — R20.2 PARESTHESIAS: ICD-10-CM

## 2023-08-31 DIAGNOSIS — R20.0 NUMBNESS: Primary | ICD-10-CM

## 2023-08-31 LAB
ALBUMIN SERPL BCP-MCNC: 4.7 G/DL (ref 3.5–5)
ALP SERPL-CCNC: 56 U/L (ref 34–104)
ALT SERPL W P-5'-P-CCNC: 17 U/L (ref 7–52)
ANION GAP SERPL CALCULATED.3IONS-SCNC: 6 MMOL/L
AST SERPL W P-5'-P-CCNC: 19 U/L (ref 13–39)
BASOPHILS # BLD AUTO: 0.07 THOUSANDS/ÂΜL (ref 0–0.1)
BASOPHILS NFR BLD AUTO: 1 % (ref 0–1)
BILIRUB SERPL-MCNC: 0.54 MG/DL (ref 0.2–1)
BUN SERPL-MCNC: 20 MG/DL (ref 5–25)
CALCIUM SERPL-MCNC: 10 MG/DL (ref 8.4–10.2)
CHLORIDE SERPL-SCNC: 102 MMOL/L (ref 96–108)
CO2 SERPL-SCNC: 32 MMOL/L (ref 21–32)
CREAT SERPL-MCNC: 0.76 MG/DL (ref 0.6–1.3)
EOSINOPHIL # BLD AUTO: 0.11 THOUSAND/ÂΜL (ref 0–0.61)
EOSINOPHIL NFR BLD AUTO: 2 % (ref 0–6)
ERYTHROCYTE [DISTWIDTH] IN BLOOD BY AUTOMATED COUNT: 12.5 % (ref 11.6–15.1)
GFR SERPL CREATININE-BSD FRML MDRD: 89 ML/MIN/1.73SQ M
GLUCOSE SERPL-MCNC: 91 MG/DL (ref 65–140)
HCT VFR BLD AUTO: 41.7 % (ref 34.8–46.1)
HGB BLD-MCNC: 13.7 G/DL (ref 11.5–15.4)
IMM GRANULOCYTES # BLD AUTO: 0.01 THOUSAND/UL (ref 0–0.2)
IMM GRANULOCYTES NFR BLD AUTO: 0 % (ref 0–2)
LYMPHOCYTES # BLD AUTO: 2.22 THOUSANDS/ÂΜL (ref 0.6–4.47)
LYMPHOCYTES NFR BLD AUTO: 34 % (ref 14–44)
MCH RBC QN AUTO: 31.1 PG (ref 26.8–34.3)
MCHC RBC AUTO-ENTMCNC: 32.9 G/DL (ref 31.4–37.4)
MCV RBC AUTO: 95 FL (ref 82–98)
MONOCYTES # BLD AUTO: 0.41 THOUSAND/ÂΜL (ref 0.17–1.22)
MONOCYTES NFR BLD AUTO: 6 % (ref 4–12)
NEUTROPHILS # BLD AUTO: 3.71 THOUSANDS/ÂΜL (ref 1.85–7.62)
NEUTS SEG NFR BLD AUTO: 57 % (ref 43–75)
NRBC BLD AUTO-RTO: 0 /100 WBCS
PLATELET # BLD AUTO: 250 THOUSANDS/UL (ref 149–390)
PMV BLD AUTO: 9.2 FL (ref 8.9–12.7)
POTASSIUM SERPL-SCNC: 4.4 MMOL/L (ref 3.5–5.3)
PROT SERPL-MCNC: 7.6 G/DL (ref 6.4–8.4)
RBC # BLD AUTO: 4.41 MILLION/UL (ref 3.81–5.12)
SODIUM SERPL-SCNC: 140 MMOL/L (ref 135–147)
TSH SERPL DL<=0.05 MIU/L-ACNC: 1.76 UIU/ML (ref 0.45–4.5)
WBC # BLD AUTO: 6.53 THOUSAND/UL (ref 4.31–10.16)

## 2023-08-31 PROCEDURE — 80053 COMPREHEN METABOLIC PANEL: CPT | Performed by: EMERGENCY MEDICINE

## 2023-08-31 PROCEDURE — 36415 COLL VENOUS BLD VENIPUNCTURE: CPT | Performed by: EMERGENCY MEDICINE

## 2023-08-31 PROCEDURE — 85025 COMPLETE CBC W/AUTO DIFF WBC: CPT | Performed by: EMERGENCY MEDICINE

## 2023-08-31 PROCEDURE — 84443 ASSAY THYROID STIM HORMONE: CPT | Performed by: EMERGENCY MEDICINE

## 2023-08-31 PROCEDURE — 99283 EMERGENCY DEPT VISIT LOW MDM: CPT

## 2023-08-31 NOTE — TELEPHONE ENCOUNTER
Patient testing was negative, she is still having numbness in limbs, she wants to know what else or who else to see. She's very worried.

## 2023-08-31 NOTE — ED PROVIDER NOTES
History  Chief Complaint   Patient presents with   • Numbness     Pt presents to the ED with b/l numbness in lower feet up to knees onset since Friday. (Reji Piedra) Reji Piedra is a 48 y.o. female who identifies as female    They presented to the emergency department on August 31, 2023. Patient presents with:  Numbness: Pt presents to the ED with b/l numbness in lower feet up to knees onset since Friday. .    The patient states that on August 12 of this year she had had shrimp for dinner, and subsequently developed food poisoning with nausea as well as vomiting and diarrhea. Patient notes that the symptoms resolved and she was able to proceed on a cruise with her  and returned without issue. Patient states however that this past Friday (6 days ago) she had progression of numbness that began in her bilateral feet that has now expanded to just distal to her bilateral knees. Patient notes that she went to her family doctor on Tuesday who evaluated her in the office and gave her prescription for blood work included CBC, CMP, folate, B12, Lyme antibody, urinalysis without acute pertinent findings. Patient notes that she has continued to have this sensation of numbness in bilateral lower extremities and came to the emergency department for evaluation. Patient denies any motor involvement, or paresthesias of her bilateral lower extremities, and notes that she has worked extensively as a  and has overuse injury of her right thumb. Patient also notes that approximately 1 month ago she did notice a tick that was on her abdomen, however has not noticed any rashes or other concerning symptoms from that time and was able to remove the tick without issue. Patient  notes that patient's bilateral lower legs have appeared more swollen recently.   Patient denies fever, chills, chest pain, shortness of breath, abdominal pain, current complaints of nausea, vomiting, diarrhea, change in urination, rash, or any other complaint at this time. Allergies include:  No Known Allergies      Immunizations:    Immunization History  Administered            Date(s) Administered   COVID-19 MODERNA VACC 0.5 ML IM                         04/19/2021 05/17/2021     Hep A, adult          08/12/2019     Tdap                  01/27/2010 08/12/2019    Immunizations Reviewed. Prior to Admission Medications   Prescriptions Last Dose Informant Patient Reported? Taking? SUMAtriptan (IMITREX) 100 mg tablet   No No   Sig: TAKE 1 TABLET BY MOUTH ONCE AS NEEDED FOR MIGRAINE   cyclobenzaprine (FLEXERIL) 10 mg tablet   No No   Sig: Take 1 tablet (10 mg total) by mouth 3 (three) times a day as needed for muscle spasms   Patient not taking: Reported on 8/29/2023   ibuprofen (MOTRIN) 200 mg tablet   Yes No   Sig: Take by mouth every 6 (six) hours as needed for mild pain   triamcinolone (KENALOG) 0.1 % cream   No No   Sig: Apply topically 2 (two) times a day To the surrounding skin around the right upper arm scar for 2 weeks or until cleared. Patient not taking: Reported on 1/18/2022      Facility-Administered Medications: None       Past Medical History:   Diagnosis Date   • Basal cell carcinoma 11/17/2021    Right upper arm    • COVID-19        Past Surgical History:   Procedure Laterality Date   • WISDOM TOOTH EXTRACTION         Family History   Problem Relation Age of Onset   • Skin cancer Mother    • Migraines Mother    • Prostate cancer Father    • Hypertension Father    • Hyperlipidemia Father    • Skin cancer Father    • Stroke Maternal Grandmother    • Migraines Maternal Grandmother    • Diabetes Paternal Grandfather    • Bipolar disorder Maternal Uncle    • Colon cancer Paternal Aunt 79   • Migraines Sister      I have reviewed and agree with the history as documented.     E-Cigarette/Vaping   • E-Cigarette Use Never User      E-Cigarette/Vaping Substances   • Nicotine No    • THC No    • CBD No    • Flavoring No • Other No    • Unknown No      Social History     Tobacco Use   • Smoking status: Never   • Smokeless tobacco: Never   Vaping Use   • Vaping Use: Never used   Substance Use Topics   • Alcohol use: Never     Comment: None   • Drug use: Never        Review of Systems   Constitutional: Negative for chills and fever. HENT: Negative for ear pain and sore throat. Eyes: Negative for pain and visual disturbance. Respiratory: Negative for cough and shortness of breath. Cardiovascular: Positive for leg swelling (Per ). Negative for chest pain and palpitations. Gastrointestinal: Negative for abdominal pain and vomiting. Genitourinary: Negative for dysuria and hematuria. Musculoskeletal: Negative for arthralgias and back pain. Skin: Negative for color change and rash. Neurological: Positive for numbness (Bilateral lower extremities). Negative for seizures and syncope. All other systems reviewed and are negative. Physical Exam  ED Triage Vitals [08/31/23 1603]   Temperature Pulse Respirations Blood Pressure SpO2   97.7 °F (36.5 °C) 96 16 (!) 195/92 98 %      Temp Source Heart Rate Source Patient Position - Orthostatic VS BP Location FiO2 (%)   Oral Monitor Sitting Right arm --      Pain Score       --             Orthostatic Vital Signs  Vitals:    08/31/23 1603 08/31/23 1700 08/31/23 1730   BP: (!) 195/92 159/72 151/69   Pulse: 96 73 71   Patient Position - Orthostatic VS: Sitting         Physical Exam  Vitals and nursing note reviewed. Constitutional:       General: She is not in acute distress. Appearance: Normal appearance. HENT:      Head: Normocephalic and atraumatic. Right Ear: External ear normal.      Left Ear: External ear normal.      Nose: Nose normal.      Mouth/Throat:      Mouth: Mucous membranes are moist.   Eyes:      Conjunctiva/sclera: Conjunctivae normal.   Cardiovascular:      Rate and Rhythm: Normal rate and regular rhythm.    Pulmonary:      Effort: Pulmonary effort is normal. No respiratory distress. Breath sounds: Normal breath sounds. Abdominal:      General: Abdomen is flat. Bowel sounds are normal.      Tenderness: There is no abdominal tenderness. There is no guarding or rebound. Musculoskeletal:         General: Normal range of motion. Cervical back: Normal range of motion. Right lower leg: No edema. Left lower leg: No edema. Skin:     General: Skin is warm and dry. Capillary Refill: Capillary refill takes less than 2 seconds. Neurological:      General: No focal deficit present. Mental Status: She is alert. Mental status is at baseline. Cranial Nerves: No cranial nerve deficit. Sensory: Sensory deficit (Decree sensation bilateral lower extremities extending distally from inferior aspect of knee to bilateral feet) present. Motor: No weakness.    Psychiatric:         Mood and Affect: Mood normal.         ED Medications  Medications - No data to display    Diagnostic Studies  Results Reviewed     Procedure Component Value Units Date/Time    TSH, 3rd generation with Free T4 reflex [[id number]]  (Normal) Collected: 08/31/23 1654    Lab Status: Final result Specimen: Blood from Arm, Left Updated: 08/31/23 1730     TSH 3RD GENERATON 1.757 uIU/mL     Comprehensive metabolic panel [[id number]] Collected: 08/31/23 1654    Lab Status: Final result Specimen: Blood from Arm, Left Updated: 08/31/23 1714     Sodium 140 mmol/L      Potassium 4.4 mmol/L      Chloride 102 mmol/L      CO2 32 mmol/L      ANION GAP 6 mmol/L      BUN 20 mg/dL      Creatinine 0.76 mg/dL      Glucose 91 mg/dL      Calcium 10.0 mg/dL      AST 19 U/L      ALT 17 U/L      Alkaline Phosphatase 56 U/L      Total Protein 7.6 g/dL      Albumin 4.7 g/dL      Total Bilirubin 0.54 mg/dL      eGFR 89 ml/min/1.73sq m     Narrative:      [place] guidelines for Chronic Kidney Disease (CKD):   •  Stage 1 with normal or high GFR (GFR > 90 mL/min/1.73 square meters)  •  Stage 2 Mild CKD (GFR = 60-89 mL/min/1.73 square meters)  •  Stage 3A Moderate CKD (GFR = 45-59 mL/min/1.73 square meters)  •  Stage 3B Moderate CKD (GFR = 30-44 mL/min/1.73 square meters)  •  Stage 4 Severe CKD (GFR = 15-29 mL/min/1.73 square meters)  •  Stage 5 End Stage CKD (GFR <15 mL/min/1.73 square meters)  Note: GFR calculation is accurate only with a steady state creatinine    CBC and differential [[id number]] Collected: 08/31/23 1654    Lab Status: Final result Specimen: Blood from Arm, Left Updated: 08/31/23 1700     WBC 6.53 Thousand/uL      RBC 4.41 Million/uL      Hemoglobin 13.7 g/dL      Hematocrit 41.7 %      MCV 95 fL      MCH 31.1 pg      MCHC 32.9 g/dL      RDW 12.5 %      MPV 9.2 fL      Platelets 510 Thousands/uL      nRBC 0 /100 WBCs      Neutrophils Relative 57 %      Immat GRANS % 0 %      Lymphocytes Relative 34 %      Monocytes Relative 6 %      Eosinophils Relative 2 %      Basophils Relative 1 %      Neutrophils Absolute 3.71 Thousands/µL      Immature Grans Absolute 0.01 Thousand/uL      Lymphocytes Absolute 2.22 Thousands/µL      Monocytes Absolute 0.41 Thousand/µL      Eosinophils Absolute 0.11 Thousand/µL      Basophils Absolute 0.07 Thousands/µL                  No orders to display         Procedures  Procedures      ED Course  ED Course as of 08/31/23 1818   Thu Aug 31, 2023   1715 CBC and differential  Reassuring, within normal limits     1715 Comprehensive metabolic panel  Reassuring, within normal limits     1715 Patient able to provide recent blood work via [facility] from [facility], no abnormalities in blood work or pertinent findings reported. [id number] Patient advised of CBC as well as CMP results. Pending TSH at this time. Anticipate discharge home. Patient states that she will follow-up with her neurologist after discharge for complaint of numbness.    1731 TSH 3RD GENERATON: 1.757                                       Medical Decision Making  Patient presents with:  Numbness: Pt presents to the ED with b/l numbness in lower feet up to knees onset since Friday. Patient seen and examined noted to have clear lungs to auscultation bilaterally, abdomen soft non-tender without guarding or rebound, regular rate and rhythm, strength 5 out of 5 in all extremities, cranial nerves II through XII intact, mildly decreased sensation of bilateral lower extremities distal to bilateral knees. Temp:  (97.7 °F (36.5 °C)) 97.7 °F (36.5 °C)  HR:  (71-96) 71  Resp:  (16-18) 18  BP: (151-195)/(69-92) 151/69    Differential diagnosis includes but is not limited to Guillain-Barré (sensory variant), hypothyroidism.       Due to patient's history and presentation the following laboratory evidence was collected:  Recent Results (from the past 12 hour(s))  -CBC and differential:   Collection Time: 08/31/23  4:54 PM       Result                      Value             Ref Range           WBC                         6.53              4.31 - 10.16*       RBC                         4.41              3.81 - 5.12 *       Hemoglobin                  13.7              11.5 - 15.4 *       Hematocrit                  41.7              34.8 - 46.1 %       MCV                         95                82 - 98 fL          MCH                         31.1              26.8 - 34.3 *       MCHC                        32.9              31.4 - 37.4 *       RDW                         12.5              11.6 - 15.1 %       MPV                         9.2               8.9 - 12.7 fL       Platelets                   250               149 - 390 Th*       nRBC                        0                 /100 WBCs           Neutrophils Relative        57                43 - 75 %           Immat GRANS %               0                 0 - 2 %             Lymphocytes Relative        34                14 - 44 %           Monocytes Relative          6                 4 - 12 % Eosinophils Relative        2                 0 - 6 %             Basophils Relative          1                 0 - 1 %             Neutrophils Absolute        3.71              1.85 - 7.62 *       Immature Grans Absolute     0.01              0.00 - 0.20 *       Lymphocytes Absolute        2.22              0.60 - 4.47 *       Monocytes Absolute          0.41              0.17 - 1.22 *       Eosinophils Absolute        0.11              0.00 - 0.61 *       Basophils Absolute          0.07              0.00 - 0.10 *  -Comprehensive metabolic panel:   Collection Time: 08/31/23  4:54 PM       Result                      Value             Ref Range           Sodium                      140               135 - 147 mm*       Potassium                   4.4               3.5 - 5.3 mm*       Chloride                    102               96 - 108 mmo*       CO2                         32                21 - 32 mmol*       ANION GAP                   6                 mmol/L              BUN                         20                5 - 25 mg/dL        Creatinine                  0.76              0.60 - 1.30 *       Glucose                     91                65 - 140 mg/*       Calcium                     10.0              8.4 - 10.2 m*       AST                         19                13 - 39 U/L         ALT                         17                7 - 52 U/L          Alkaline Phosphatase        56                34 - 104 U/L        Total Protein               7.6               6.4 - 8.4 g/*       Albumin                     4.7               3.5 - 5.0 g/*       Total Bilirubin             0.54              0.20 - 1.00 *       eGFR                        89                ml/min/1.73s*  -TSH, 3rd generation with Free T4 reflex:   Collection Time: 08/31/23  4:54 PM       Result                      Value             Ref Range           TSH 3RD GENERATON           1.757             0.450 - 4.50*      Reviewed patient's discharge to home/self care. Follow-up Information     Follow up With Specialties Details Why Contact Info    Pam Carreno MD Family Medicine Call  As needed [address]  [address] [address]  [phone]            Discharge Medication List as of 8/31/2023  5:40 PM      CONTINUE these medications which have NOT CHANGED    Details   cyclobenzaprine (FLEXERIL) 10 mg tablet Take 1 tablet (10 mg total) by mouth 3 (three) times a day as needed for muscle spasms, Starting Tue 1/18/2022, Normal      ibuprofen (MOTRIN) 200 mg tablet Take by mouth every 6 (six) hours as needed for mild pain, Historical Med      SUMAtriptan (IMITREX) 100 mg tablet TAKE 1 TABLET BY MOUTH ONCE AS NEEDED FOR MIGRAINE, Normal      triamcinolone (KENALOG) 0.1 % cream Apply topically 2 (two) times a day To the surrounding skin around the right upper arm scar for 2 weeks or until cleared., Starting u 12/2/2021, Normal           No discharge procedures on file. PDMP Review     None           ED Provider  Attending physically available and evaluated Zackary Hopkins. I managed the patient along with the ED Attending.     Electronically Signed by         Monica Parrish MD  08/31/23 1964

## 2023-08-31 NOTE — ED ATTENDING ATTESTATION
8/31/2023  King Amin DO, saw and evaluated the patient. I have discussed the patient with the resident/non-physician practitioner and agree with the resident's/non-physician practitioner's findings, Plan of Care, and MDM as documented in the resident's/non-physician practitioner's note, except where noted. All available labs and Radiology studies were reviewed. I was present for key portions of any procedure(s) performed by the resident/non-physician practitioner and I was immediately available to provide assistance. At this point I agree with the current assessment done in the Emergency Department. I have conducted an independent evaluation of this patient a history and physical is as follows:    48year old female with no major past medical history presents for evaluation with bilateral lower extremity numbness which has been ongoing since Friday. Patient states that symptoms initially began in her feet, and have since progressed up her lower legs to her knees. She denies any associated weakness. Denies any recent fevers, chills, chest pain, SOB, or other concerning symptoms. On chart review, patient was seen by her PCP on 8/29 for the same thing, and at that time she had told her PCP about a recent GI illness on 8/12, as well as a tick bite at the end of June. Patient was given a script to get lab work done including a CBC, CMP, B12, folate, Vit D, Mag, and Lyme testing, but no lab results noted in the patient's chart at this time. Patient reported that she had these labs done elsewhere and these were all within normal limits. Vitals:    08/31/23 1602 08/31/23 1603 08/31/23 1700 08/31/23 1730   BP:  (!) 195/92 159/72 151/69   BP Location:  Right arm Right arm Right arm   Pulse:  96 73 71   Resp:  16 16 18   Temp:  97.7 °F (36.5 °C)     TempSrc:  Oral     SpO2:  98% 97% 98%   Weight: 72.9 kg (160 lb 11.5 oz)        General: VSS, NAD, awake, alert. Head: Normocephalic, atraumatic.   Eyes: PERRL, EOM-I.   ENT: Atraumatic external nose and ears. Neck: Symmetric, supple, trachea midline. CV: RRR. +S1/S2. No murmurs. Peripheral pulses +2 throughout. Lungs: Respirations unlabored, no tachypnea. CTAB, lungs sounds equal bilateral.   Abd: soft, NT/ND. No guarding. No peritoneal signs. MSK: Extremities without tenderness or gross deformity. No lower extremity edema. Skin: Dry, intact. No lesions. Neuro: AAOx3, GCS 15, CN II-XII grossly intact. Motor grossly intact. Reported diminished sensation in bilateral feet. Normal proprioception of bilateral lower extremities. Psychiatric/Behavioral: Appropriate mood and affect.  Behavior normal.    Results Reviewed     Procedure Component Value Units Date/Time    TSH, 3rd generation with Free T4 reflex [[id number]]  (Normal) Collected: 08/31/23 1654    Lab Status: Final result Specimen: Blood from Arm, Left Updated: 08/31/23 1730     TSH 3RD GENERATON 1.757 uIU/mL     Comprehensive metabolic panel [[id number]] Collected: 08/31/23 1654    Lab Status: Final result Specimen: Blood from Arm, Left Updated: 08/31/23 1714     Sodium 140 mmol/L      Potassium 4.4 mmol/L      Chloride 102 mmol/L      CO2 32 mmol/L      ANION GAP 6 mmol/L      BUN 20 mg/dL      Creatinine 0.76 mg/dL      Glucose 91 mg/dL      Calcium 10.0 mg/dL      AST 19 U/L      ALT 17 U/L      Alkaline Phosphatase 56 U/L      Total Protein 7.6 g/dL      Albumin 4.7 g/dL      Total Bilirubin 0.54 mg/dL      eGFR 89 ml/min/1.73sq m     Narrative:      [place] guidelines for Chronic Kidney Disease (CKD):   •  Stage 1 with normal or high GFR (GFR > 90 mL/min/1.73 square meters)  •  Stage 2 Mild CKD (GFR = 60-89 mL/min/1.73 square meters)  •  Stage 3A Moderate CKD (GFR = 45-59 mL/min/1.73 square meters)  •  Stage 3B Moderate CKD (GFR = 30-44 mL/min/1.73 square meters)  •  Stage 4 Severe CKD (GFR = 15-29 mL/min/1.73 square meters)  •  Stage 5 End Stage CKD (GFR <15 mL/min/1.73 square meters)  Note: GFR calculation is accurate only with a steady state creatinine    CBC and differential [[id number]] Collected: 08/31/23 1654    Lab Status: Final result Specimen: Blood from Arm, Left Updated: 08/31/23 1700     WBC 6.53 Thousand/uL      RBC 4.41 Million/uL      Hemoglobin 13.7 g/dL      Hematocrit 41.7 %      MCV 95 fL      MCH 31.1 pg      MCHC 32.9 g/dL      RDW 12.5 %      MPV 9.2 fL      Platelets 379 Thousands/uL      nRBC 0 /100 WBCs      Neutrophils Relative 57 %      Immat GRANS % 0 %      Lymphocytes Relative 34 %      Monocytes Relative 6 %      Eosinophils Relative 2 %      Basophils Relative 1 %      Neutrophils Absolute 3.71 Thousands/µL      Immature Grans Absolute 0.01 Thousand/uL      Lymphocytes Absolute 2.22 Thousands/µL      Monocytes Absolute 0.41 Thousand/µL      Eosinophils Absolute 0.11 Thousand/µL      Basophils Absolute 0.07 Thousands/µL                 ED Course         Critical Care Time  Procedures

## 2023-08-31 NOTE — DISCHARGE INSTRUCTIONS
Please follow up with your primary care provider concerning your visit today. Please return to the Emergency Department if you develop any weakness, difficulty breathing, fever, chills, vomiting, diarrhea, or for any other concerns. Please follow-up with your neurologist concerning today's visit.

## 2023-09-01 ENCOUNTER — TELEPHONE (OUTPATIENT)
Dept: NEUROLOGY | Facility: CLINIC | Age: 53
End: 2023-09-01

## 2023-09-01 NOTE — TELEPHONE ENCOUNTER
Patient was seen by Leonila Pedraza over a year ago for headaches. This is a new concern. Would need a visit to address new concerns. Please schedule patient for follow up.

## 2023-09-01 NOTE — TELEPHONE ENCOUNTER
Pt was last seen by Dr. Hilary Shen. Pt was in the ED for numbness. Pt needs a nurse call to triage her concerns and then directions on how pt should f/u with our group. Thank you.

## 2023-10-17 ENCOUNTER — OFFICE VISIT (OUTPATIENT)
Dept: FAMILY MEDICINE CLINIC | Facility: CLINIC | Age: 53
End: 2023-10-17
Payer: COMMERCIAL

## 2023-10-17 VITALS
SYSTOLIC BLOOD PRESSURE: 126 MMHG | RESPIRATION RATE: 18 BRPM | HEART RATE: 77 BPM | TEMPERATURE: 97 F | HEIGHT: 67 IN | BODY MASS INDEX: 24.61 KG/M2 | WEIGHT: 156.8 LBS | OXYGEN SATURATION: 99 % | DIASTOLIC BLOOD PRESSURE: 80 MMHG

## 2023-10-17 DIAGNOSIS — R05.1 ACUTE COUGH: Primary | ICD-10-CM

## 2023-10-17 PROCEDURE — 99213 OFFICE O/P EST LOW 20 MIN: CPT

## 2023-10-17 RX ORDER — BENZONATATE 100 MG/1
100 CAPSULE ORAL 3 TIMES DAILY PRN
Qty: 20 CAPSULE | Refills: 0 | Status: SHIPPED | OUTPATIENT
Start: 2023-10-17

## 2023-10-17 NOTE — ASSESSMENT & PLAN NOTE
Non productive cough  and itchy throat x 48 hrs, spouse recently dx with Pneumonia. On exam lungs clear to auscultation denies fever, chest pain, SOB. Pt had negative at home COVID test 10/16/23. Start  benzonatate capsules, antihistamine pt reports has at  home and advised seek immediate medical care for worsening sxs.

## 2023-10-17 NOTE — PROGRESS NOTES
Name: Ondina Watson      : 1970      MRN: [id number]  Encounter Provider: CRISTY Millan  Encounter Date: 10/17/2023   Encounter department: [facility] [address] MEDICINE    Assessment & Plan     1. Acute cough  Assessment & Plan:  Non productive cough  and itchy throat x 48 hrs, spouse recently dx with Pneumonia. On exam lungs clear to auscultation denies fever, chest pain, SOB. Pt had negative at home COVID test 10/16/23. Start  benzonatate capsules, antihistamine pt reports has at  home and advised seek immediate medical care for worsening sxs. Orders:  -     benzonatate (TESSALON PERLES) 100 mg capsule; Take 1 capsule (100 mg total) by mouth 3 (three) times a day as needed for cough        Depression Screening and Follow-up Plan: Patient was screened for depression during today's encounter. They screened negative with a PHQ-2 score of 0. Subjective      Pt presents with c/o non-productive cough, scratchy throat that started on . Pt states that she has been taking Nyquil, took at home COVID test yesterday and was negative. Pt states had last mammogram 2022 and has appointment scheduled through [facility] for screening mammogram this December. Review of Systems   Constitutional:  Negative for activity change, appetite change, chills, fatigue and fever. HENT:  Negative for congestion, ear discharge, postnasal drip, sinus pressure, sinus pain, sore throat and trouble swallowing. Eyes:  Negative for discharge and itching. Respiratory:  Positive for cough. Negative for chest tightness, shortness of breath and wheezing. Cardiovascular:  Negative for chest pain. Gastrointestinal:  Negative for vomiting. Skin:  Negative for color change. Allergic/Immunologic: Positive for environmental allergies. Neurological:  Negative for headaches.        Current Outpatient Medications on File Prior to Visit   Medication Sig    ibuprofen (MOTRIN) 200 mg tablet Take by mouth every 6 (six) hours as needed for mild pain    SUMAtriptan (IMITREX) 100 mg tablet TAKE 1 TABLET BY MOUTH ONCE AS NEEDED FOR MIGRAINE    cyclobenzaprine (FLEXERIL) 10 mg tablet Take 1 tablet (10 mg total) by mouth 3 (three) times a day as needed for muscle spasms (Patient not taking: Reported on 8/29/2023)    triamcinolone (KENALOG) 0.1 % cream Apply topically 2 (two) times a day To the surrounding skin around the right upper arm scar for 2 weeks or until cleared. (Patient not taking: Reported on 1/18/2022)       Objective     /80 (BP Location: Left arm, Patient Position: Sitting, Cuff Size: Standard)   Pulse 77   Temp (!) 97 °F (36.1 °C) (Tympanic)   Resp 18   Ht 5' 7" (1.702 m)   Wt 71.1 kg (156 lb 12.8 oz)   SpO2 99%   BMI 24.56 kg/m²     Physical Exam  Constitutional:       General: She is not in acute distress. Appearance: Normal appearance. She is not ill-appearing, toxic-appearing or diaphoretic. HENT:      Head: Normocephalic and atraumatic. Right Ear: Tympanic membrane, ear canal and external ear normal. There is no impacted cerumen. Left Ear: Tympanic membrane, ear canal and external ear normal. There is no impacted cerumen. Nose: Nose normal. No congestion or rhinorrhea. Mouth/Throat:      Mouth: Mucous membranes are moist.      Pharynx: Oropharynx is clear. Posterior oropharyngeal erythema present. No oropharyngeal exudate. Eyes:      General: No scleral icterus. Right eye: No discharge. Left eye: No discharge. Extraocular Movements: Extraocular movements intact. Conjunctiva/sclera: Conjunctivae normal.      Pupils: Pupils are equal, round, and reactive to light. Cardiovascular:      Rate and Rhythm: Normal rate and regular rhythm. Pulses: Normal pulses. Heart sounds: Normal heart sounds. Pulmonary:      Effort: Pulmonary effort is normal. No respiratory distress.       Breath sounds: Normal breath sounds. No stridor. No wheezing, rhonchi or rales. Chest:      Chest wall: No tenderness. Abdominal:      General: Bowel sounds are normal.   Musculoskeletal:         General: Normal range of motion. Cervical back: Normal range of motion. Skin:     General: Skin is warm. Findings: No erythema or rash. Neurological:      General: No focal deficit present. Mental Status: She is alert and oriented to person, place, and time. Sensory: No sensory deficit. Motor: No weakness. Coordination: Coordination normal.      Gait: Gait normal.      Deep Tendon Reflexes: Reflexes normal.   Psychiatric:         Mood and Affect: Mood normal.         Behavior: Behavior normal.         Thought Content:  Thought content normal.         Judgment: Judgment normal.       [address]

## 2023-11-03 ENCOUNTER — OFFICE VISIT (OUTPATIENT)
Dept: FAMILY MEDICINE CLINIC | Facility: CLINIC | Age: 53
End: 2023-11-03
Payer: COMMERCIAL

## 2023-11-03 VITALS
SYSTOLIC BLOOD PRESSURE: 120 MMHG | BODY MASS INDEX: 24.8 KG/M2 | HEART RATE: 64 BPM | HEIGHT: 67 IN | OXYGEN SATURATION: 99 % | DIASTOLIC BLOOD PRESSURE: 82 MMHG | RESPIRATION RATE: 16 BRPM | TEMPERATURE: 97 F | WEIGHT: 158 LBS

## 2023-11-03 DIAGNOSIS — Z85.828 HISTORY OF SKIN CANCER: ICD-10-CM

## 2023-11-03 DIAGNOSIS — Z12.11 COLON CANCER SCREENING: ICD-10-CM

## 2023-11-03 DIAGNOSIS — Z00.00 ANNUAL PHYSICAL EXAM: ICD-10-CM

## 2023-11-03 DIAGNOSIS — G43.109 MIGRAINE WITH AURA AND WITHOUT STATUS MIGRAINOSUS, NOT INTRACTABLE: ICD-10-CM

## 2023-11-03 DIAGNOSIS — E78.5 DYSLIPIDEMIA: Primary | ICD-10-CM

## 2023-11-03 PROBLEM — R05.1 ACUTE COUGH: Status: RESOLVED | Noted: 2023-10-17 | Resolved: 2023-11-03

## 2023-11-03 PROBLEM — U07.1 COVID-19 VIRUS INFECTION: Status: RESOLVED | Noted: 2022-01-13 | Resolved: 2023-11-03

## 2023-11-03 PROBLEM — R53.83 FATIGUE: Status: RESOLVED | Noted: 2022-01-13 | Resolved: 2023-11-03

## 2023-11-03 PROBLEM — R07.81 RIB PAIN: Status: RESOLVED | Noted: 2022-01-13 | Resolved: 2023-11-03

## 2023-11-03 PROCEDURE — 99396 PREV VISIT EST AGE 40-64: CPT | Performed by: FAMILY MEDICINE

## 2023-11-03 NOTE — PROGRESS NOTES
Name: Wilber Apple      : 1970      MRN: [id number]  Encounter Provider: Shana Solano MD  Encounter Date: 11/3/2023   Encounter department: [facility] [address]    Assessment & Plan     1. Dyslipidemia  Assessment & Plan:  Discussion on diet and exercise guidelines for weight loss and  health reviewed with pt       Orders:  -     Lipid panel; Future    2. Annual physical exam  Assessment & Plan:  Labs were done       3. Migraine with aura and without status migrainosus, not intractable  Assessment & Plan:  Less frequent now ok with meds       4. History of skin cancer  Assessment & Plan:  Basal cell removed       5. Colon cancer screening  -     Cologuard        Depression Screening and Follow-up Plan: Patient was screened for depression during today's encounter. They screened negative with a PHQ-2 score of 0. Subjective      HPI Patient here for annual physical no complaints     Review of Systems   Constitutional:  Negative for appetite change, chills, fatigue and fever. Respiratory:  Negative for cough, chest tightness and shortness of breath. Cardiovascular:  Negative for chest pain, palpitations and leg swelling. Gastrointestinal:  Negative for abdominal pain, constipation, diarrhea, nausea and vomiting. Genitourinary:  Negative for difficulty urinating and frequency. Musculoskeletal:  Negative for arthralgias, back pain, gait problem and neck pain. Skin:  Negative for rash. Neurological:  Negative for dizziness, weakness, light-headedness, numbness and headaches. Hematological:  Does not bruise/bleed easily. Psychiatric/Behavioral:  Negative for dysphoric mood and sleep disturbance. The patient is not nervous/anxious.         Current Outpatient Medications on File Prior to Visit   Medication Sig   • ibuprofen (MOTRIN) 200 mg tablet Take by mouth every 6 (six) hours as needed for mild pain   • SUMAtriptan (IMITREX) 100 mg tablet TAKE 1 TABLET BY MOUTH ONCE AS NEEDED FOR MIGRAINE   • [DISCONTINUED] benzonatate (TESSALON PERLES) 100 mg capsule Take 1 capsule (100 mg total) by mouth 3 (three) times a day as needed for cough (Patient not taking: Reported on 11/3/2023)   • [DISCONTINUED] cyclobenzaprine (FLEXERIL) 10 mg tablet Take 1 tablet (10 mg total) by mouth 3 (three) times a day as needed for muscle spasms (Patient not taking: Reported on 8/29/2023)   • [DISCONTINUED] triamcinolone (KENALOG) 0.1 % cream Apply topically 2 (two) times a day To the surrounding skin around the right upper arm scar for 2 weeks or until cleared. (Patient not taking: Reported on 1/18/2022)       Objective     /82 (BP Location: Left arm, Patient Position: Sitting, Cuff Size: Standard)   Pulse 64   Temp (!) 97 °F (36.1 °C) (Tympanic)   Resp 16   Ht 5' 7" (1.702 m)   Wt 71.7 kg (158 lb)   SpO2 99%   BMI 24.75 kg/m²     Physical Exam  Vitals reviewed. Constitutional:       General: She is not in acute distress. Appearance: Normal appearance. She is well-developed. HENT:      Head: Normocephalic. Right Ear: Tympanic membrane, ear canal and external ear normal.      Left Ear: Tympanic membrane, ear canal and external ear normal.      Nose: Nose normal.      Mouth/Throat:      Pharynx: No oropharyngeal exudate. Eyes:      General: Lids are normal.      Extraocular Movements: Extraocular movements intact. Conjunctiva/sclera: Conjunctivae normal.      Pupils: Pupils are equal, round, and reactive to light. Neck:      Thyroid: No thyromegaly. Vascular: No carotid bruit. Cardiovascular:      Rate and Rhythm: Normal rate and regular rhythm. Pulses: Normal pulses. Heart sounds: Normal heart sounds. No murmur heard. No friction rub. Pulmonary:      Effort: Pulmonary effort is normal. No respiratory distress. Breath sounds: Normal breath sounds. No stridor. No wheezing or rales.    Chest:   Breasts:     Breasts are symmetrical.      Right: Normal. No swelling, bleeding, inverted nipple, mass, nipple discharge, skin change or tenderness. Left: Normal. No swelling, bleeding, inverted nipple, mass, nipple discharge, skin change or tenderness. Abdominal:      General: Bowel sounds are normal. There is no distension. Palpations: Abdomen is soft. There is no mass. Tenderness: There is no abdominal tenderness. There is no guarding. Hernia: No hernia is present. Musculoskeletal:         General: Normal range of motion. Cervical back: Full passive range of motion without pain, normal range of motion and neck supple. Lymphadenopathy:      Cervical: No cervical adenopathy. Skin:     General: Skin is warm and dry. Findings: No rash. Comments: Nl appearing moles   Neurological:      General: No focal deficit present. Mental Status: She is alert and oriented to person, place, and time. Mental status is at baseline. Cranial Nerves: No cranial nerve deficit. Sensory: No sensory deficit. Motor: No abnormal muscle tone. Coordination: Coordination normal.      Gait: Gait normal.      Deep Tendon Reflexes: Reflexes normal. Babinski sign absent on the right side. Psychiatric:         Mood and Affect: Mood normal.         Speech: Speech normal.         Behavior: Behavior normal.         Thought Content:  Thought content normal.         Judgment: Judgment normal.       Tadeo Kramer MD

## 2024-02-07 ENCOUNTER — TELEPHONE (OUTPATIENT)
Age: 54
End: 2024-02-07

## 2024-02-07 NOTE — TELEPHONE ENCOUNTER
Patient called and would like to know if      Dr. Martinez would be able to refill her SUMAtriptan (IMITREX) 100 mg tablet.   Patient stated she has been taking the medication for 7 years now and it works well.  The neurologist that last prescribed the medication is no longer with the practice and the patient would like to know if Dr. Martinez could send the refill request to [facility] [address], [place].